# Patient Record
Sex: FEMALE | Race: WHITE | Employment: FULL TIME | URBAN - NONMETROPOLITAN AREA
[De-identification: names, ages, dates, MRNs, and addresses within clinical notes are randomized per-mention and may not be internally consistent; named-entity substitution may affect disease eponyms.]

---

## 2020-10-06 ENCOUNTER — OFFICE VISIT (OUTPATIENT)
Dept: PRIMARY CARE CLINIC | Age: 25
End: 2020-10-06
Payer: COMMERCIAL

## 2020-10-06 ENCOUNTER — HOSPITAL ENCOUNTER (OUTPATIENT)
Age: 25
Setting detail: SPECIMEN
Discharge: HOME | End: 2020-10-06
Payer: COMMERCIAL

## 2020-10-06 VITALS
HEIGHT: 66 IN | TEMPERATURE: 97.9 F | DIASTOLIC BLOOD PRESSURE: 68 MMHG | HEART RATE: 76 BPM | RESPIRATION RATE: 18 BRPM | SYSTOLIC BLOOD PRESSURE: 100 MMHG | BODY MASS INDEX: 25.88 KG/M2 | WEIGHT: 161 LBS | OXYGEN SATURATION: 97 %

## 2020-10-06 LAB
-: ABNORMAL
AMORPHOUS: ABNORMAL
BACTERIA: ABNORMAL
BILIRUBIN URINE: NEGATIVE
CASTS UA: ABNORMAL /LPF (ref 0–2)
COLOR: ABNORMAL
COMMENT UA: ABNORMAL
CRYSTALS, UA: ABNORMAL /HPF
EPITHELIAL CELLS UA: ABNORMAL /HPF (ref 0–5)
GLUCOSE URINE: NEGATIVE
KETONES, URINE: NEGATIVE
LEUKOCYTE ESTERASE, URINE: ABNORMAL
MUCUS: ABNORMAL
NITRITE, URINE: NEGATIVE
OTHER OBSERVATIONS UA: ABNORMAL
PH UA: 5.5 (ref 5–6)
PROTEIN UA: NEGATIVE
RBC UA: ABNORMAL /HPF (ref 0–4)
RENAL EPITHELIAL, UA: ABNORMAL /HPF
SPECIFIC GRAVITY UA: 1.02 (ref 1.01–1.02)
TRICHOMONAS: ABNORMAL
TURBIDITY: ABNORMAL
URINE HGB: ABNORMAL
UROBILINOGEN, URINE: NORMAL
WBC UA: ABNORMAL /HPF (ref 0–4)
YEAST: ABNORMAL

## 2020-10-06 PROCEDURE — 87077 CULTURE AEROBIC IDENTIFY: CPT

## 2020-10-06 PROCEDURE — 99203 OFFICE O/P NEW LOW 30 MIN: CPT | Performed by: FAMILY MEDICINE

## 2020-10-06 PROCEDURE — 87186 SC STD MICRODIL/AGAR DIL: CPT

## 2020-10-06 PROCEDURE — 87088 URINE BACTERIA CULTURE: CPT

## 2020-10-06 PROCEDURE — 81001 URINALYSIS AUTO W/SCOPE: CPT

## 2020-10-06 PROCEDURE — 87086 URINE CULTURE/COLONY COUNT: CPT

## 2020-10-06 RX ORDER — IBUPROFEN 200 MG
200 TABLET ORAL EVERY 6 HOURS PRN
COMMUNITY

## 2020-10-06 RX ORDER — NITROFURANTOIN 25; 75 MG/1; MG/1
100 CAPSULE ORAL 2 TIMES DAILY
Qty: 14 CAPSULE | Refills: 0 | Status: SHIPPED | OUTPATIENT
Start: 2020-10-06 | End: 2020-10-13

## 2020-10-06 SDOH — HEALTH STABILITY: MENTAL HEALTH: HOW OFTEN DO YOU HAVE A DRINK CONTAINING ALCOHOL?: NEVER

## 2020-10-06 ASSESSMENT — ENCOUNTER SYMPTOMS
COUGH: 0
SHORTNESS OF BREATH: 0
BACK PAIN: 0
NAUSEA: 0
VOMITING: 0

## 2020-10-06 NOTE — PROGRESS NOTES
Respiratory: Negative for cough and shortness of breath. Gastrointestinal: Negative for nausea and vomiting. Genitourinary: Positive for dysuria, frequency and urgency. Negative for difficulty urinating, flank pain, hematuria, hesitancy and vaginal discharge. Musculoskeletal: Negative for back pain. Objective:      Physical Exam  Vitals signs and nursing note reviewed. Constitutional:       General: She is not in acute distress. Appearance: She is well-developed. Eyes:      Conjunctiva/sclera: Conjunctivae normal.   Neck:      Musculoskeletal: Normal range of motion and neck supple. Thyroid: No thyromegaly. Cardiovascular:      Rate and Rhythm: Normal rate and regular rhythm. Heart sounds: Normal heart sounds. No murmur. Pulmonary:      Effort: Pulmonary effort is normal. No respiratory distress. Breath sounds: Normal breath sounds. No wheezing. Abdominal:      General: Abdomen is flat. Palpations: Abdomen is soft. Tenderness: There is abdominal tenderness in the suprapubic area. There is right CVA tenderness. There is no left CVA tenderness. Lymphadenopathy:      Cervical: No cervical adenopathy. Skin:     General: Skin is warm and dry. Findings: No erythema or rash. Neurological:      Mental Status: She is alert and oriented to person, place, and time. /68 (Site: Right Upper Arm, Position: Sitting, Cuff Size: Medium Adult)   Pulse 76   Temp 97.9 °F (36.6 °C)   Resp 18   Ht 5' 6\" (1.676 m)   Wt 161 lb (73 kg)   LMP 09/29/2020   SpO2 97%   BMI 25.99 kg/m²     Assessment:       Diagnosis Orders   1. Acute cystitis with hematuria     2.  Dysuria  Urinalysis Reflex to Culture      Hospital Outpatient Visit on 10/06/2020   Component Date Value Ref Range Status    Color, UA 10/06/2020 NOT REPORTED  YELLOW Final    Turbidity UA 10/06/2020 NOT REPORTED  CLEAR Final    Glucose, Ur 10/06/2020 NEGATIVE  NEGATIVE Final    Bilirubin Urine 10/06/2020 NEGATIVE  NEGATIVE Final    Ketones, Urine 10/06/2020 NEGATIVE  NEGATIVE Final    Specific [place], UA 10/06/2020 1.020  1.010 - 1.025 Final    Urine Hgb 10/06/2020 2+* NEGATIVE Final    pH, UA 10/06/2020 5.5  5.0 - 6.0 Final    Protein, UA 10/06/2020 NEGATIVE  NEGATIVE Final    Urobilinogen, Urine 10/06/2020 Normal  Normal Final    Nitrite, Urine 10/06/2020 NEGATIVE  NEGATIVE Final    Leukocyte Esterase, Urine 10/06/2020 3+* NEGATIVE Final    Urinalysis Comments 10/06/2020 NOT REPORTED   Final    - 10/06/2020        Final    WBC, UA 10/06/2020 10 TO 25  0 - 4 /HPF Final    RBC, UA 10/06/2020 10 TO 25  0 - 4 /HPF Final    Casts UA 10/06/2020 NOT REPORTED  0 - 2 /LPF Final    Crystals, UA 10/06/2020 NOT REPORTED  None /HPF Final    Epithelial Cells UA 10/06/2020 0 TO 4  0 - 5 /HPF Final    Renal Epithelial, UA 10/06/2020 NOT REPORTED  0 /HPF Final    Bacteria, UA 10/06/2020 TRACE* None Final    Mucus, UA 10/06/2020 NOT REPORTED  None Final    Trichomonas, UA 10/06/2020 NOT REPORTED  None Final    Amorphous, UA 10/06/2020 NOT REPORTED  None Final    Other Observations UA 10/06/2020 Specimen Cultured* NOT REQ. Final    Yeast, UA 10/06/2020 NOT REPORTED  None Final            Plan:        UTI: new; her UA shows she has a UTI. I will treat with macrobid for 7 days and follow up on the culture. If the culture shows resistance to macrobid then she will be notified and her antibiotic will be changed. I encouraged her to use tylenol or ibuprofen for pain. she was told to return to the clinic or ER if symptoms worsen, if she develops fevers or severe back pain. she understood and agreed with the plan. Return if symptoms worsen or fail to improve.     Orders Placed This Encounter   Procedures    Urinalysis Reflex to Culture     Standing Status:   Future     Number of Occurrences:   1     Standing Expiration Date:   10/6/2021     Order Specific Question: SPECIFY(EX-CATH,MIDSTREAM,CYSTO,ETC)? Answer:   clean catch     Orders Placed This Encounter   Medications    nitrofurantoin, macrocrystal-monohydrate, (MACROBID) 100 MG capsule     Sig: Take 1 capsule by mouth 2 times daily for 7 days     Dispense:  14 capsule     Refill:  0       Patientgiven educational materials - see patient instructions. Discussed use, benefit,and side effects of prescribed medications. All patient questions answered. Ptvoiced understanding. Reviewed health maintenance. Instructed to continue currentmedications, diet and exercise. Patient agreed with treatment plan. Follow up asdirected.      Electronically signed by Michelle Eldridge MD on 10/6/2020 at 10:38 AM

## 2020-10-07 LAB
CULTURE: ABNORMAL
CULTURE: ABNORMAL
Lab: ABNORMAL
SPECIMEN DESCRIPTION: ABNORMAL

## 2020-10-08 RX ORDER — CEPHALEXIN 500 MG/1
500 CAPSULE ORAL 3 TIMES DAILY
Qty: 21 CAPSULE | Refills: 0 | Status: SHIPPED | OUTPATIENT
Start: 2020-10-08 | End: 2020-10-15

## 2020-10-22 ENCOUNTER — OFFICE VISIT (OUTPATIENT)
Dept: FAMILY MEDICINE CLINIC | Age: 25
End: 2020-10-22
Payer: COMMERCIAL

## 2020-10-22 VITALS
HEIGHT: 67 IN | WEIGHT: 163.6 LBS | DIASTOLIC BLOOD PRESSURE: 70 MMHG | BODY MASS INDEX: 25.68 KG/M2 | SYSTOLIC BLOOD PRESSURE: 112 MMHG | HEART RATE: 60 BPM

## 2020-10-22 PROCEDURE — 99395 PREV VISIT EST AGE 18-39: CPT | Performed by: PHYSICIAN ASSISTANT

## 2020-10-22 ASSESSMENT — PATIENT HEALTH QUESTIONNAIRE - PHQ9
SUM OF ALL RESPONSES TO PHQ9 QUESTIONS 1 & 2: 0
2. FEELING DOWN, DEPRESSED OR HOPELESS: 0
SUM OF ALL RESPONSES TO PHQ QUESTIONS 1-9: 0
SUM OF ALL RESPONSES TO PHQ QUESTIONS 1-9: 0
1. LITTLE INTEREST OR PLEASURE IN DOING THINGS: 0
SUM OF ALL RESPONSES TO PHQ QUESTIONS 1-9: 0

## 2020-10-22 NOTE — PROGRESS NOTES
CHIEF COMPLAINT  Chief Complaint   Patient presents with   Alfie Khoury is a 23 y.o. female who presents to the office for annual wellness examination and to establish care. Patient says that she has been doing well. She maintains an active lifestyle and eats a healthy diet. She has never had cervical cancer screening and agrees to completing. Patient denies concerns or complaints today. ROS  All other review of systems negative, except for those noted. PAST MEDICAL HISTORY    Past Medical History:   Diagnosis Date    Concussion     h/o, x 7, athletics       SURGICAL HISTORY    History reviewed. No pertinent surgical history.     FAMILY HISTORY    Family History   Problem Relation Age of Onset    Thyroid Disease Mother     Prostate Cancer Father         prostate removed    No Known Problems Sister     No Known Problems Brother     Other Maternal Grandmother         brain aneurysm    No Known Problems Maternal Grandfather     Lung Cancer Paternal Grandmother     Cancer Paternal Grandfather         liver    No Known Problems Brother        SOCIAL HISTORY    Social History     Socioeconomic History    Marital status:      Spouse name: None    Number of children: None    Years of education: None    Highest education level: None   Occupational History    None   Social Needs    Financial resource strain: None    Food insecurity     Worry: None     Inability: None    Transportation needs     Medical: None     Non-medical: None   Tobacco Use    Smoking status: Never Smoker    Smokeless tobacco: Never Used   Substance and Sexual Activity    Alcohol use: Never     Frequency: Never    Drug use: Never    Sexual activity: Yes     Partners: Male     Birth control/protection: Condom   Lifestyle    Physical activity     Days per week: None     Minutes per session: None    Stress: None   Relationships    Social connections     Talks on phone: None     Gets encounter. FINAL DIAGNOSIS AND ORDERS   Diagnosis Orders   1. Well adult exam  Comprehensive Metabolic Panel   2. Screening for cervical cancer  [facility], CNP, Gynecology, [place]   3. Screening, lipid  Lipid Panel       ASSESSMENT & PLAN  1. History reviewed. Fasting laboratory studies ordered. Healthy diet and routine exercise. Referral for pap testing. Follow-up annually and PRN. DISCHARGE MEDS  Outpatient Encounter Medications as of 10/22/2020   Medication Sig Dispense Refill    ibuprofen (ADVIL;MOTRIN) 200 MG tablet Take 200 mg by mouth every 6 hours as needed for Pain       No facility-administered encounter medications on file as of 10/22/2020.

## 2020-10-23 ENCOUNTER — HOSPITAL ENCOUNTER (OUTPATIENT)
Dept: LAB | Age: 25
Discharge: HOME | End: 2020-10-23
Payer: COMMERCIAL

## 2020-10-23 LAB
ALBUMIN SERPL-MCNC: 4.5 G/DL (ref 3.5–5.2)
ALBUMIN/GLOBULIN RATIO: 1.8 (ref 1–2.5)
ALP BLD-CCNC: 69 U/L (ref 35–104)
ALT SERPL-CCNC: 7 U/L (ref 5–33)
ANION GAP SERPL CALCULATED.3IONS-SCNC: 9 MMOL/L (ref 9–17)
AST SERPL-CCNC: 13 U/L
BILIRUB SERPL-MCNC: 0.4 MG/DL (ref 0.3–1.2)
BUN BLDV-MCNC: 14 MG/DL (ref 6–20)
BUN/CREAT BLD: 23 (ref 9–20)
CALCIUM SERPL-MCNC: 9.4 MG/DL (ref 8.6–10.4)
CHLORIDE BLD-SCNC: 106 MMOL/L (ref 98–107)
CHOLESTEROL/HDL RATIO: 3.1
CHOLESTEROL: 152 MG/DL
CO2: 25 MMOL/L (ref 20–31)
CREAT SERPL-MCNC: 0.6 MG/DL (ref 0.5–0.9)
GFR AFRICAN AMERICAN: >60 ML/MIN
GFR NON-AFRICAN AMERICAN: >60 ML/MIN
GFR SERPL CREATININE-BSD FRML MDRD: ABNORMAL ML/MIN/{1.73_M2}
GFR SERPL CREATININE-BSD FRML MDRD: ABNORMAL ML/MIN/{1.73_M2}
GLUCOSE BLD-MCNC: 97 MG/DL (ref 70–99)
HDLC SERPL-MCNC: 49 MG/DL
LDL CHOLESTEROL: 87 MG/DL (ref 0–130)
POTASSIUM SERPL-SCNC: 4.4 MMOL/L (ref 3.7–5.3)
SODIUM BLD-SCNC: 140 MMOL/L (ref 135–144)
TOTAL PROTEIN: 7 G/DL (ref 6.4–8.3)
TRIGL SERPL-MCNC: 79 MG/DL
VLDLC SERPL CALC-MCNC: NORMAL MG/DL (ref 1–30)

## 2020-10-23 PROCEDURE — 36415 COLL VENOUS BLD VENIPUNCTURE: CPT

## 2020-10-23 PROCEDURE — 80061 LIPID PANEL: CPT

## 2020-10-23 PROCEDURE — 80053 COMPREHEN METABOLIC PANEL: CPT

## 2020-10-29 ENCOUNTER — HOSPITAL ENCOUNTER (OUTPATIENT)
Age: 25
Setting detail: SPECIMEN
Discharge: HOME | End: 2020-10-29
Payer: COMMERCIAL

## 2020-10-29 ENCOUNTER — OFFICE VISIT (OUTPATIENT)
Dept: OBGYN | Age: 25
End: 2020-10-29
Payer: COMMERCIAL

## 2020-10-29 VITALS
HEIGHT: 67 IN | HEART RATE: 78 BPM | BODY MASS INDEX: 25.58 KG/M2 | DIASTOLIC BLOOD PRESSURE: 70 MMHG | WEIGHT: 163 LBS | SYSTOLIC BLOOD PRESSURE: 102 MMHG | TEMPERATURE: 96.9 F

## 2020-10-29 PROCEDURE — 99385 PREV VISIT NEW AGE 18-39: CPT | Performed by: NURSE PRACTITIONER

## 2020-10-29 PROCEDURE — G0145 SCR C/V CYTO,THINLAYER,RESCR: HCPCS

## 2020-10-29 NOTE — PROGRESS NOTES
Carina Serena  10/29/2020              23 y.o. Chief Complaint   Patient presents with    Gynecologic Exam         Patient's last menstrual period was 09/29/2020. FAY Wray  [facility], [address] [place]    HPI :Annual Exam  Patient presents for annual exam.  Counseling on healthy lifestyle reviewed, as well as the need for self breast exam. We discussed and reviewed the need for routine screenings and immunization updates when appropriate. Menarche age 8. Menses regular every month lasting 7 days with no excessive bleeding or pain. Using condoms and is happy with that method. Not planning pregnancy in th foreseeable future. Recently . Has had no other sexual partners, and declines STI screening. Patient does not perform self breast exam.  Is aware of the need to do so on a monthly basis. Proper technique was reviewed and shower card was provided. The patient is sexually active. no prior history of gyn screening tests  The patient has regular exercise: not regularly . We did review the need for and frequency of both weight bearing and strengthening as tolerated by the patient. ________________________________________________________________________  OB History   No obstetric history on file. Past Medical History:   Diagnosis Date    Concussion     h/o, x 7, athletics                                                                   History reviewed. No pertinent surgical history.   Family History   Problem Relation Age of Onset    Thyroid Disease Mother     Prostate Cancer Father         prostate removed    No Known Problems Sister     No Known Problems Brother     Other Maternal Grandmother         brain aneurysm    No Known Problems Maternal Grandfather     Lung Cancer Paternal Grandmother     Cancer Paternal Grandfather         liver    No Known Problems Brother      Social History     Socioeconomic History    Marital status:  Spouse name: Not on file    Number of children: Not on file    Years of education: Not on file    Highest education level: Not on file   Occupational History    Not on file   Social Needs    Financial resource strain: Not on file    Food insecurity     Worry: Not on file     Inability: Not on file    Transportation needs     Medical: Not on file     Non-medical: Not on file   Tobacco Use    Smoking status: Never Smoker    Smokeless tobacco: Never Used   Substance and Sexual Activity    Alcohol use: Never     Frequency: Never    Drug use: Never    Sexual activity: Yes     Partners: Male     Birth control/protection: Condom   Lifestyle    Physical activity     Days per week: Not on file     Minutes per session: Not on file    Stress: Not on file   Relationships    Social connections     Talks on phone: Not on file     Gets together: Not on file     Attends Mu-ism service: Not on file     Active member of club or organization: Not on file     Attends meetings of clubs or organizations: Not on file     Relationship status: Not on file    Intimate partner violence     Fear of current or ex partner: Not on file     Emotionally abused: Not on file     Physically abused: Not on file     Forced sexual activity: Not on file   Other Topics Concern    Not on file   Social History Narrative    Not on file       MEDICATIONS:  Current Outpatient Medications   Medication Sig Dispense Refill    ibuprofen (ADVIL;MOTRIN) 200 MG tablet Take 200 mg by mouth every 6 hours as needed for Pain       No current facility-administered medications for this visit. ALLERGIES:  Allergies as of 10/29/2020 - Review Complete 10/29/2020   Allergen Reaction Noted    Pcn [penicillins] Hives 10/06/2020       Gynecologic History:  Menarche: 13   Menopause at n/a      Patient's last menstrual period was 09/29/2020.   Hormone Exposure: No    Family History of Breast, Ovarian , Colon or Uterine Cancer: No ________________________________________________________________________      Review Of Systems:  General ROS:  negative  Hematological and Lymphatic ROS:negative   Breast ROS: negative  Cardiovascular ROS: negative  Respiratory ROS: negative   Gastrointestinal ROS: negative  Genito-Urinary ROS: negative  Psychological ROS: negative  Neurological ROS: negative  Musculoskeletal ROS: negative  Dermatological ROS: negative                                                                                                                                                                                   PHYSICAL Exam:     Constitutional:  Blood pressure 102/70, pulse 78, temperature 96.9 °F (36.1 °C), temperature source Temporal, height 5' 7\" (1.702 m), weight 163 lb (73.9 kg), last menstrual period 09/29/2020. General Appearance: This  is a well Developed, well Nourished, well groomed female. Her BMI was reviewed. Skin:  There was a Normal Inspection of the skin without rashes or lesions. Lymphatic:  No Lymph Nodes were Palpable in the neck , axilla or groin. Neck and EENT:  The neck was supple. There were no masses   The thyroid was not enlarged and had no masses. PERRLA, Nares Patent No Masses    Respiratory: The lungs were auscultated and found to be clear. There were no rales, rhonchi or wheezes. There was a good respiratory effort. Cardiovascular: The heart was in a regular rate and rhythm. There was no murmur appreciated. Extremities: The patients extremities were without calf tenderness, edema, or varicosities. There was full range of motion in all four extremities. Pulses in all four extremities    Abdomen: The abdomen was soft and non-tender. There was no guarding, rebound or rigidity. On evaluation there was no evidence of hepatosplenomegaly and there was no costal vertebral kelley tenderness bilaterally. No hernias were appreciated. Psych:   The patient had a normal 2/28/2021    PAP SMEAR     Standing Status:   Future     Number of Occurrences:   1     Standing Expiration Date:   12/29/2020     Order Specific Question:   Collection Type     Answer: Thin Prep     Order Specific Question:   Prior Abnormal Pap Test     Answer:   No     Order Specific Question:   Screening or Diagnostic     Answer:   Screening     Order Specific Question:   HPV Requested?      Answer:   Yes - If Abnormal Reflex HPV       Funmi Turpin  10/29/2020

## 2020-10-29 NOTE — PATIENT INSTRUCTIONS
Vitamin D3 1000 IU (25 mcg) daily with largest meal      Patient Education        Breast Self-Exam: Care Instructions  Your Care Instructions     A breast self-exam is when you check your breasts for lumps or changes. This regular exam helps you learn how your breasts normally look and feel. Most breast problems or changes are not because of cancer. Breast self-exam is not a substitute for a mammogram. Having regular breast exams by your doctor and regular mammograms improve your chances of finding any problems with your breasts. Some women set a time each month to do a step-by-step breast self-exam. Other women like a less formal system. They might look at their breasts as they brush their teeth, or feel their breasts once in a while in the shower. If you notice a change in your breast, tell your doctor. Follow-up care is a key part of your treatment and safety. Be sure to make and go to all appointments, and call your doctor if you are having problems. It's also a good idea to know your test results and keep a list of the medicines you take. How do you do a breast self-exam?  · The best time to examine your breasts is usually one week after your menstrual period begins. Your breasts should not be tender then. If you do not have periods, you might do your exam on a day of the month that is easy to remember. · To examine your breasts:  ? Remove all your clothes above the waist and lie down. When you are lying down, your breast tissue spreads evenly over your chest wall, which makes it easier to feel all your breast tissue. ? Use the pads--not the fingertips--of the 3 middle fingers of your left hand to check your right breast. Move your fingers slowly in small coin-sized circles that overlap. ? Use three levels of pressure to feel of all your breast tissue. Use light pressure to feel the tissue close to the skin surface. Use medium pressure to feel a little deeper.  Use firm pressure to feel your tissue close to your breastbone and ribs. Use each pressure level to feel your breast tissue before moving on to the next spot. ? Check your entire breast, moving up and down as if following a strip from the collarbone to the bra line, and from the armpit to the ribs. Repeat until you have covered the entire breast.  ? Repeat this procedure for your left breast, using the pads of the 3 middle fingers of your right hand. · To examine your breasts while in the shower:  ? Place one arm over your head and lightly soap your breast on that side. ? Using the pads of your fingers, gently move your hand over your breast (in the strip pattern described above), feeling carefully for any lumps or changes. ? Repeat for the other breast.  · Have your doctor inspect anything you notice to see if you need further testing. Where can you learn more? Go to [email or web address] and sign in to your [employer] account. Enter P148 in the [employer] box to learn more about \"Breast Self-Exam: Care Instructions. \"     If you do not have an account, please click on the \"Sign Up Now\" link. Current as of: April 29, 2020               Content Version: 12.6  © 6001-7484 [employer]. Care instructions adapted under license by [facility] [employer] [facility] ([facility]). If you have questions about a medical condition or this instruction, always ask your healthcare professional. [place] [address] any warranty or liability for your use of this information.

## 2020-11-06 ENCOUNTER — TELEPHONE (OUTPATIENT)
Dept: OBGYN | Age: 25
End: 2020-11-06

## 2020-11-06 LAB — CYTOLOGY REPORT: NORMAL

## 2020-11-06 NOTE — TELEPHONE ENCOUNTER
Patient contacted and advised of over due labs TSH and T4 . Patient would still lina to have drawn.  Orders extended for patient

## 2020-11-09 ENCOUNTER — HOSPITAL ENCOUNTER (OUTPATIENT)
Dept: LAB | Age: 25
Discharge: HOME | End: 2020-11-09
Payer: COMMERCIAL

## 2020-11-09 LAB
THYROXINE, FREE: 1.11 NG/DL (ref 0.93–1.7)
TSH SERPL DL<=0.05 MIU/L-ACNC: 1.44 MIU/L (ref 0.3–5)

## 2020-11-09 PROCEDURE — 36415 COLL VENOUS BLD VENIPUNCTURE: CPT

## 2020-11-09 PROCEDURE — 84443 ASSAY THYROID STIM HORMONE: CPT

## 2020-11-09 PROCEDURE — 84439 ASSAY OF FREE THYROXINE: CPT

## 2020-12-08 ENCOUNTER — OFFICE VISIT (OUTPATIENT)
Dept: PRIMARY CARE CLINIC | Age: 25
End: 2020-12-08
Payer: COMMERCIAL

## 2020-12-08 ENCOUNTER — HOSPITAL ENCOUNTER (OUTPATIENT)
Age: 25
Setting detail: SPECIMEN
Discharge: HOME | End: 2020-12-08
Payer: COMMERCIAL

## 2020-12-08 VITALS
WEIGHT: 168 LBS | OXYGEN SATURATION: 99 % | TEMPERATURE: 98.9 F | DIASTOLIC BLOOD PRESSURE: 80 MMHG | BODY MASS INDEX: 26.31 KG/M2 | SYSTOLIC BLOOD PRESSURE: 128 MMHG | HEART RATE: 88 BPM

## 2020-12-08 PROCEDURE — U0003 INFECTIOUS AGENT DETECTION BY NUCLEIC ACID (DNA OR RNA); SEVERE ACUTE RESPIRATORY SYNDROME CORONAVIRUS 2 (SARS-COV-2) (CORONAVIRUS DISEASE [COVID-19]), AMPLIFIED PROBE TECHNIQUE, MAKING USE OF HIGH THROUGHPUT TECHNOLOGIES AS DESCRIBED BY CMS-2020-01-R: HCPCS

## 2020-12-08 PROCEDURE — 99213 OFFICE O/P EST LOW 20 MIN: CPT | Performed by: NURSE PRACTITIONER

## 2020-12-08 ASSESSMENT — ENCOUNTER SYMPTOMS
WHEEZING: 0
NAUSEA: 1
SHORTNESS OF BREATH: 0
COUGH: 1
VOMITING: 0

## 2020-12-09 NOTE — PATIENT INSTRUCTIONS
Will notify you of covid test result as soon as available. You should isolate at home in an area away from family. If you must be around family members, please wear a mask. Quarantine at home until result is available. This means do not go to work/school, attend family gatherings, or invite others to your home until you know your test results. A work/school note will be provided for you. Symptoms appear viral; no antibiotic warranted at this time. May try mucinex (guaifenesin) to help with chest congestion and robitussin for persistent cough if needed. May use tylenol or ibuprofen for fever/body aches/headache. Increase water intake to help thin secretions. Use cool mist humidifier at bedtime. Use nasal saline flush as needed. Practice good hand hygiene and cover your cough and sneeze to prevent passing virus on. If symptoms worsen or fail to improve, please return to clinic. If you develop severe worsening of symptoms such as chest pain or difficulty breathing, please go to ER. Patient Education        Coronavirus ([id number]): Care Instructions  Overview  The coronavirus disease (COVID-19) is caused by a virus. Symptoms may include a fever, a cough, and shortness of breath. It mainly spreads person-to-person through droplets from coughing and sneezing. The virus also can spread when people are in close contact with someone who is infected. Most people have mild symptoms and can take care of themselves at home. If their symptoms get worse, they may need care in a hospital. Treatment may include medicines to reduce symptoms, plus breathing support such as oxygen therapy or a ventilator. It's important to not spread the virus to others. If you have COVID-19, wear a face cover anytime you are around other people. You need to isolate yourself while you are sick. Leave your home only if you need to get medical care or testing. Follow-up care is a key part of your treatment and safety.  Be sure to make and go to all appointments, and call your doctor if you are having problems. It's also a good idea to know your test results and keep a list of the medicines you take. How can you care for yourself at home? · Get extra rest. It can help you feel better. · Drink plenty of fluids. This helps replace fluids lost from fever. Fluids also help ease a scratchy throat. Water, soup, fruit juice, and hot tea with lemon are good choices. · Take acetaminophen (such as Tylenol) to reduce a fever. It may also help with muscle aches. Read and follow all instructions on the label. · Use petroleum jelly on sore skin. This can help if the skin around your nose and lips becomes sore from rubbing a lot with tissues. Tips for self-isolation  · Limit contact with people in your home. If possible, stay in a separate bedroom and use a separate bathroom. · Wear a cloth face cover when you are around other people. It can help stop the spread of the virus when you cough or sneeze. · If you have to leave home, avoid crowds and try to stay at least 6 feet away from other people. · Avoid contact with pets and other animals. · Cover your mouth and nose with a tissue when you cough or sneeze. Then throw it in the trash right away. · Wash your hands often, especially after you cough or sneeze. Use soap and water, and scrub for at least 20 seconds. If soap and water aren't available, use an alcohol-based hand . · Don't share personal household items. These include bedding, towels, cups and glasses, and eating utensils. · [address] in the warmest water allowed for the fabric type, and dry it completely. It's okay to wash other people's laundry with yours. · Clean and disinfect your home every day. Use household  and disinfectant wipes or sprays. Take special care to clean things that you grab with your hands. These include doorknobs, remote controls, phones, and handles on your refrigerator and microwave.  And don't forget countertops, tabletops, bathrooms, and computer keyboards. When you can end self-isolation  · If you know or suspect that you have COVID-19, stay in self-isolation until:  ? You haven't had a fever for 3 days, and  ? Your symptoms have improved, and  ? It's been at least 10 days since your symptoms started. · Talk to your doctor about whether you also need testing, especially if you have a weakened immune system. When should you call for help? Call 911 anytime you think you may need emergency care. For example, call if you have life-threatening symptoms, such as:    · You have severe trouble breathing. (You can't talk at all.)     · You have constant chest pain or pressure.     · You are severely dizzy or lightheaded.     · You are confused or can't think clearly.     · Your face and lips have a blue color.     · You pass out (lose consciousness) or are very hard to wake up. Call your doctor now or seek immediate medical care if:    · You have moderate trouble breathing. (You can't speak a full sentence.)     · You are coughing up blood (more than about 1 teaspoon).     · You have signs of low blood pressure. These include feeling lightheaded; being too weak to stand; and having cold, pale, clammy skin. Watch closely for changes in your health, and be sure to contact your doctor if:    · Your symptoms get worse.     · You are not getting better as expected. Call before you go to the doctor's office. Follow their instructions. And wear a cloth face cover. Current as of: July 10, 2020               Content Version: 12.6  © 2006-2020 [employer]. Care instructions adapted under license by [facility] ([facility]). If you have questions about a medical condition or this instruction, always ask your healthcare professional. [address] any warranty or liability for your use of this information.

## 2020-12-09 NOTE — PROGRESS NOTES
[facility]  [facility] [place]  [place] [address] [place]  Dept: [phone]  Dept Fax: [phone]  Loc: [phone]        CHIEF COMPLAINT       Chief Complaint   Patient presents with    Headache     x2 days. body aches. nausea. fatigue. Nurses Notes reviewed and I agree except as noted in the HPI. HISTORY OF PRESENT ILLNESS   Carina Lyons is a 23 y.o. female who presents to [facility] today (12/8/2020) for evaluation of:   Headache    This is a new problem. Episode onset: x 2 days. The pain is located in the frontal region. Associated symptoms include coughing (slight), muscle aches and nausea. Pertinent negatives include no fever or vomiting. Associated symptoms comments: Fatigue, slight sinus congestion. The symptoms are aggravated by bright light (computer/phone screens). She has tried NSAIDs for the symptoms. The treatment provided mild relief. There is no history of hypertension, migraine headaches or recent head traumas. REVIEW OF SYSTEMS     Review of Systems   Constitutional: Negative for fever. HENT: Positive for congestion (slight). Respiratory: Positive for cough (slight). Negative for shortness of breath and wheezing. Cardiovascular: Negative for chest pain. Gastrointestinal: Positive for nausea. Negative for vomiting. Musculoskeletal: Negative for myalgias. Neurological: Positive for headaches. PAST MEDICAL HISTORY         Diagnosis Date    Concussion     h/o, x 7, athletics       SURGICAL HISTORY     Patient  has no past surgical history on file. CURRENT MEDICATIONS       Outpatient Medications Prior to Visit   Medication Sig Dispense Refill    ibuprofen (ADVIL;MOTRIN) 200 MG tablet Take 200 mg by mouth every 6 hours as needed for Pain       No facility-administered medications prior to visit. ALLERGIES     Patient is is allergic to pcn [penicillins].     FAMILY HISTORY     Patient's family history includes Cancer in her paternal grandfather; Roxy Carlton in her paternal grandmother; No Known Problems in her brother, brother, maternal grandfather, and sister; Other in her maternal grandmother; Prostate Cancer in her father; Thyroid Disease in her mother. SOCIAL HISTORY     Patient  reports that she has never smoked. She has never used smokeless tobacco. She reports that she does not drink alcohol or use drugs. PHYSICAL EXAM     VITALS  BP: 128/80, Temp: 98.9 °F (37.2 °C), Pulse: 88,  , SpO2: 99 %  Physical Exam  Vitals signs reviewed. Constitutional:       General: She is not in acute distress. Appearance: She is not ill-appearing. HENT:      Right Ear: Tympanic membrane and ear canal normal.      Left Ear: Tympanic membrane and ear canal normal.      Nose: Rhinorrhea present. No congestion. Right Turbinates: Not enlarged or swollen. Left Turbinates: Not enlarged or swollen. Comments: Bilateral turbinates inflamed     Mouth/Throat:      Lips: Pink. Mouth: Mucous membranes are moist.      Pharynx: Oropharynx is clear. No oropharyngeal exudate or posterior oropharyngeal erythema. Comments: Clear drainage noted in posterior pharynx  Eyes:      Pupils: Pupils are equal, round, and reactive to light. Neck:      Musculoskeletal: Normal range of motion and neck supple. No neck rigidity. Cardiovascular:      Rate and Rhythm: Normal rate and regular rhythm. Heart sounds: Normal heart sounds, S1 normal and S2 normal. No murmur. Pulmonary:      Effort: Pulmonary effort is normal. No accessory muscle usage or respiratory distress. Breath sounds: Normal breath sounds. No wheezing or rhonchi. Musculoskeletal: Normal range of motion. Lymphadenopathy:      Cervical: No cervical adenopathy. Skin:     General: Skin is warm and dry. Capillary Refill: Capillary refill takes less than 2 seconds.    Neurological:      General: No people are in close contact with someone who is infected. Most people have mild symptoms and can take care of themselves at home. If their symptoms get worse, they may need care in a hospital. Treatment may include medicines to reduce symptoms, plus breathing support such as oxygen therapy or a ventilator. It's important to not spread the virus to others. If you have COVID-19, wear a face cover anytime you are around other people. You need to isolate yourself while you are sick. Leave your home only if you need to get medical care or testing. Follow-up care is a key part of your treatment and safety. Be sure to make and go to all appointments, and call your doctor if you are having problems. It's also a good idea to know your test results and keep a list of the medicines you take. How can you care for yourself at home? · Get extra rest. It can help you feel better. · Drink plenty of fluids. This helps replace fluids lost from fever. Fluids also help ease a scratchy throat. Water, soup, fruit juice, and hot tea with lemon are good choices. · Take acetaminophen (such as Tylenol) to reduce a fever. It may also help with muscle aches. Read and follow all instructions on the label. · Use petroleum jelly on sore skin. This can help if the skin around your nose and lips becomes sore from rubbing a lot with tissues. Tips for self-isolation  · Limit contact with people in your home. If possible, stay in a separate bedroom and use a separate bathroom. · Wear a cloth face cover when you are around other people. It can help stop the spread of the virus when you cough or sneeze. · If you have to leave home, avoid crowds and try to stay at least 6 feet away from other people. · Avoid contact with pets and other animals. · Cover your mouth and nose with a tissue when you cough or sneeze. Then throw it in the trash right away. · Wash your hands often, especially after you cough or sneeze.  Use soap and water, and scrub for at least 20 seconds. If soap and water aren't available, use an alcohol-based hand . · Don't share personal household items. These include bedding, towels, cups and glasses, and eating utensils. · [address] in the warmest water allowed for the fabric type, and dry it completely. It's okay to wash other people's laundry with yours. · Clean and disinfect your home every day. Use household  and disinfectant wipes or sprays. Take special care to clean things that you grab with your hands. These include doorknobs, remote controls, phones, and handles on your refrigerator and microwave. And don't forget countertops, tabletops, bathrooms, and computer keyboards. When you can end self-isolation  · If you know or suspect that you have COVID-19, stay in self-isolation until:  ? You haven't had a fever for 3 days, and  ? Your symptoms have improved, and  ? It's been at least 10 days since your symptoms started. · Talk to your doctor about whether you also need testing, especially if you have a weakened immune system. When should you call for help? Call 911 anytime you think you may need emergency care. For example, call if you have life-threatening symptoms, such as:    · You have severe trouble breathing. (You can't talk at all.)     · You have constant chest pain or pressure.     · You are severely dizzy or lightheaded.     · You are confused or can't think clearly.     · Your face and lips have a blue color.     · You pass out (lose consciousness) or are very hard to wake up. Call your doctor now or seek immediate medical care if:    · You have moderate trouble breathing. (You can't speak a full sentence.)     · You are coughing up blood (more than about 1 teaspoon).     · You have signs of low blood pressure. These include feeling lightheaded; being too weak to stand; and having cold, pale, clammy skin.    Watch closely for changes in your health, and be sure to contact your doctor if:    · Your symptoms get worse.     · You are not getting better as expected. Call before you go to the doctor's office. Follow their instructions. And wear a cloth face cover. Current as of: July 10, 2020               Content Version: 12.6  © 7613-1182 [employer]. Care instructions adapted under license by [facility] ([facility]). If you have questions about a medical condition or this instruction, always ask your healthcare professional. [place] [address] any warranty or liability for your use of this information. Orders Placed This Encounter   Procedures    COVID-19 Ambulatory     Standing Status:   Future     Standing Expiration Date:   12/8/2021     Scheduling Instructions:      Saline media preferred given current shortage of viral transport media but both acceptable     Order Specific Question:   Is this test for diagnosis or screening? Answer:   Diagnosis of ill patient     Order Specific Question:   Symptomatic for COVID-19 as defined by CDC? Answer:   Yes     Order Specific Question:   Date of Symptom Onset     Answer:   12/6/2020     Order Specific Question:   Hospitalized for COVID-19? Answer:   No     Order Specific Question:   Admitted to ICU for COVID-19? Answer:   No     Order Specific Question:   Employed in healthcare setting? Answer:   No     Order Specific Question:   Resident in a congregate (group) care setting? Answer:   No     Order Specific Question:   Pregnant? Answer:   No     Order Specific Question:   Previously tested for COVID-19? Answer:   No     Outpatient Encounter Medications as of 12/8/2020   Medication Sig Dispense Refill    ibuprofen (ADVIL;MOTRIN) 200 MG tablet Take 200 mg by mouth every 6 hours as needed for Pain       No facility-administered encounter medications on file as of 12/8/2020. No follow-ups on file.                 Electronically signed by DAMARIS Irby NP on 12/8/2020 at 8:39 PM

## 2020-12-12 LAB — SARS-COV-2, NAA: NOT DETECTED

## 2021-05-12 ENCOUNTER — OFFICE VISIT (OUTPATIENT)
Dept: PRIMARY CARE CLINIC | Age: 26
End: 2021-05-12

## 2021-05-12 VITALS
HEIGHT: 65 IN | DIASTOLIC BLOOD PRESSURE: 68 MMHG | OXYGEN SATURATION: 94 % | WEIGHT: 175.8 LBS | BODY MASS INDEX: 29.29 KG/M2 | SYSTOLIC BLOOD PRESSURE: 124 MMHG | RESPIRATION RATE: 18 BRPM | TEMPERATURE: 97.3 F | HEART RATE: 120 BPM

## 2021-05-12 DIAGNOSIS — H69.83 DYSFUNCTION OF BOTH EUSTACHIAN TUBES: ICD-10-CM

## 2021-05-12 DIAGNOSIS — J01.00 ACUTE NON-RECURRENT MAXILLARY SINUSITIS: Primary | ICD-10-CM

## 2021-05-12 PROCEDURE — 99212 OFFICE O/P EST SF 10 MIN: CPT | Performed by: PHYSICIAN ASSISTANT

## 2021-05-12 PROCEDURE — 99213 OFFICE O/P EST LOW 20 MIN: CPT | Performed by: PHYSICIAN ASSISTANT

## 2021-05-12 RX ORDER — FLUCONAZOLE 150 MG/1
TABLET ORAL
Qty: 2 TABLET | Refills: 0 | Status: SHIPPED | OUTPATIENT
Start: 2021-05-12 | End: 2021-08-31 | Stop reason: SDUPTHER

## 2021-05-12 RX ORDER — CEFDINIR 300 MG/1
300 CAPSULE ORAL 2 TIMES DAILY
Qty: 10 CAPSULE | Refills: 0 | Status: SHIPPED | OUTPATIENT
Start: 2021-05-12 | End: 2021-05-17

## 2021-05-12 RX ORDER — PREDNISONE 20 MG/1
20 TABLET ORAL 2 TIMES DAILY
Qty: 10 TABLET | Refills: 0 | Status: SHIPPED | OUTPATIENT
Start: 2021-05-12 | End: 2021-05-17

## 2021-05-12 ASSESSMENT — ENCOUNTER SYMPTOMS
SHORTNESS OF BREATH: 0
SORE THROAT: 1
RHINORRHEA: 1
COUGH: 1

## 2021-05-12 ASSESSMENT — PATIENT HEALTH QUESTIONNAIRE - PHQ9
2. FEELING DOWN, DEPRESSED OR HOPELESS: 0
SUM OF ALL RESPONSES TO PHQ QUESTIONS 1-9: 0
SUM OF ALL RESPONSES TO PHQ QUESTIONS 1-9: 0

## 2021-05-12 NOTE — PROGRESS NOTES
Subjective:      Patient ID: Vini Villalpando is a 23 y.o. female. Cough  This is a new problem. The current episode started in the past 7 days. Associated symptoms include ear congestion, headaches, nasal congestion, rhinorrhea and a sore throat (initially). Pertinent negatives include no fever or shortness of breath. Associated symptoms comments: No loss of taste/smell. Treatments tried: Mucinex, Delsym. The treatment provided mild relief. There is no history of asthma or environmental allergies. Review of Systems   Constitutional: Negative for fever. HENT: Positive for rhinorrhea and sore throat (initially). Respiratory: Positive for cough. Negative for shortness of breath. Allergic/Immunologic: Negative for environmental allergies. Neurological: Positive for headaches. Recent Travel Screening and Travel History documentation:     Travel Screening     Question   Response    In the last month, have you been in contact with someone who was confirmed or suspected to have Coronavirus / COVID-19? No / Unsure    Have you had a COVID-19 viral test in the last 14 days? No    Do you have any of the following new or worsening symptoms? Fatigue;Cough; Shortness of breath;Runny nose    Have you traveled internationally or domestically in the last month? No      Travel History   Travel since 04/12/21     No documented travel since 04/12/21          tested negative for COVID last week. Objective:   Physical Exam  HENT:      Head: Normocephalic. Ears:      Comments: TMs dull bilaterally     Nose: Congestion present. Mouth/Throat:      Mouth: Mucous membranes are moist.   Eyes:      Pupils: Pupils are equal, round, and reactive to light. Neck:      Musculoskeletal: Neck supple. Cardiovascular:      Rate and Rhythm: Normal rate. Pulmonary:      Effort: Pulmonary effort is normal.      Breath sounds: Normal breath sounds. Neurological:      General: No focal deficit present. Mental Status: She is alert and oriented to person, place, and time. Psychiatric:         Mood and Affect: Mood normal.         Assessment:      1. Acute non-recurrent maxillary sinusitis    2. Dysfunction of both eustachian tubes          Plan:      Patient declined COVID testing. Omnicef 300 mg tid x 10 days. Prednisone 20 mg bid x 5 days. Push fluids. Supportive care advised. Nasal saline rinses. Follow-up PRN/as planned with PCP.         FAY Dowd

## 2021-08-31 ENCOUNTER — NURSE TRIAGE (OUTPATIENT)
Dept: OTHER | Facility: CLINIC | Age: 26
End: 2021-08-31

## 2021-08-31 ENCOUNTER — HOSPITAL ENCOUNTER (OUTPATIENT)
Dept: LAB | Age: 26
Discharge: HOME | End: 2021-08-31
Payer: COMMERCIAL

## 2021-08-31 ENCOUNTER — OFFICE VISIT (OUTPATIENT)
Dept: FAMILY MEDICINE CLINIC | Age: 26
End: 2021-08-31
Payer: COMMERCIAL

## 2021-08-31 ENCOUNTER — PATIENT MESSAGE (OUTPATIENT)
Dept: FAMILY MEDICINE CLINIC | Age: 26
End: 2021-08-31

## 2021-08-31 VITALS
OXYGEN SATURATION: 97 % | TEMPERATURE: 98.5 F | WEIGHT: 181 LBS | DIASTOLIC BLOOD PRESSURE: 64 MMHG | HEART RATE: 74 BPM | SYSTOLIC BLOOD PRESSURE: 122 MMHG | BODY MASS INDEX: 30.16 KG/M2 | HEIGHT: 65 IN

## 2021-08-31 DIAGNOSIS — R30.0 DYSURIA: ICD-10-CM

## 2021-08-31 DIAGNOSIS — N39.0 URINARY TRACT INFECTION WITHOUT HEMATURIA, SITE UNSPECIFIED: Primary | ICD-10-CM

## 2021-08-31 DIAGNOSIS — R10.9 FLANK PAIN: ICD-10-CM

## 2021-08-31 DIAGNOSIS — R30.0 DYSURIA: Primary | ICD-10-CM

## 2021-08-31 DIAGNOSIS — M54.50 ACUTE MIDLINE LOW BACK PAIN WITHOUT SCIATICA: ICD-10-CM

## 2021-08-31 LAB
-: ABNORMAL
AMORPHOUS: ABNORMAL
BACTERIA: ABNORMAL
BILIRUBIN URINE: ABNORMAL
CASTS UA: ABNORMAL /LPF (ref 0–2)
COLOR: ABNORMAL
COMMENT UA: ABNORMAL
CRYSTALS, UA: ABNORMAL /HPF
EPITHELIAL CELLS UA: ABNORMAL /HPF (ref 0–5)
GLUCOSE URINE: ABNORMAL
KETONES, URINE: ABNORMAL
LEUKOCYTE ESTERASE, URINE: ABNORMAL
MUCUS: ABNORMAL
NITRITE, URINE: POSITIVE
OTHER OBSERVATIONS UA: ABNORMAL
PH UA: 5 (ref 5–6)
PROTEIN UA: ABNORMAL
RBC UA: ABNORMAL /HPF (ref 0–4)
RENAL EPITHELIAL, UA: ABNORMAL /HPF
SPECIFIC GRAVITY UA: 1.02 (ref 1.01–1.02)
TRICHOMONAS: ABNORMAL
TURBIDITY: ABNORMAL
URINE HGB: ABNORMAL
UROBILINOGEN, URINE: ABNORMAL
WBC UA: >50 /HPF (ref 0–4)
YEAST: ABNORMAL

## 2021-08-31 PROCEDURE — 81001 URINALYSIS AUTO W/SCOPE: CPT

## 2021-08-31 PROCEDURE — 87088 URINE BACTERIA CULTURE: CPT

## 2021-08-31 PROCEDURE — 87186 SC STD MICRODIL/AGAR DIL: CPT

## 2021-08-31 PROCEDURE — 99214 OFFICE O/P EST MOD 30 MIN: CPT | Performed by: FAMILY MEDICINE

## 2021-08-31 PROCEDURE — 87086 URINE CULTURE/COLONY COUNT: CPT

## 2021-08-31 RX ORDER — FLUCONAZOLE 150 MG/1
TABLET ORAL
Qty: 2 TABLET | Refills: 0 | Status: SHIPPED | OUTPATIENT
Start: 2021-08-31 | End: 2022-01-07

## 2021-08-31 RX ORDER — FLUCONAZOLE 150 MG/1
TABLET ORAL
Qty: 2 TABLET | Refills: 0 | Status: CANCELLED | OUTPATIENT
Start: 2021-08-31

## 2021-08-31 RX ORDER — CIPROFLOXACIN 500 MG/1
500 TABLET, FILM COATED ORAL 2 TIMES DAILY
Qty: 14 TABLET | Refills: 0 | Status: SHIPPED | OUTPATIENT
Start: 2021-08-31 | End: 2021-09-07

## 2021-08-31 NOTE — PROGRESS NOTES
HPI:  Patient comes in today for   Chief Complaint   Patient presents with    Dysuria     started last night    Flank Pain     started last night   c/o Dysuria,frequency and urgency the past week ,no nausea or emesis,no fever or chills. Has pain in the low back    area,No heamturia. Has had UTI year ago culture grew proteus was sensitive to Cipro. HISTORY:  Past Medical History:   Diagnosis Date    Concussion     h/o, x 7, athletics       No past surgical history on file. Family History   Problem Relation Age of Onset    Thyroid Disease Mother     Prostate Cancer Father         prostate removed    No Known Problems Sister     No Known Problems Brother     Other Maternal Grandmother         brain aneurysm    No Known Problems Maternal Grandfather     Lung Cancer Paternal Grandmother     Cancer Paternal Grandfather         liver    No Known Problems Brother        Social History     Socioeconomic History    Marital status:      Spouse name: Not on file    Number of children: Not on file    Years of education: Not on file    Highest education level: Not on file   Occupational History    Not on file   Tobacco Use    Smoking status: Never Smoker    Smokeless tobacco: Never Used   Vaping Use    Vaping Use: Never used   Substance and Sexual Activity    Alcohol use: Never    Drug use: Never    Sexual activity: Yes     Partners: Male     Birth control/protection: Condom   Other Topics Concern    Not on file   Social History Narrative    Not on file     Social Determinants of Health     Financial Resource Strain:     Difficulty of Paying Living Expenses:    Food Insecurity:     Worried About Running Out of Food in the Last Year:     [address] in the Last Year:    Transportation Needs:     Lack of Transportation (Medical):      Lack of Transportation (Non-Medical):    Physical Activity:     Days of Exercise per Week:     Minutes of Exercise per Session:    Stress:     Feeling of Stress :    Social Connections:     Frequency of Communication with Friends and Family:     Frequency of Social Gatherings with Friends and Family:     Attends Jehovah's witness Services:     Active Member of Clubs or Organizations:     Attends Club or Organization Meetings:     Marital Status:    Intimate Partner Violence:     Fear of Current or Ex-Partner:     Emotionally Abused:     Physically Abused:     Sexually Abused:        Current Outpatient Medications   Medication Sig Dispense Refill    Phenazopyridine HCl (AZO TABS PO) Take by mouth      ibuprofen (ADVIL;MOTRIN) 200 MG tablet Take 200 mg by mouth every 6 hours as needed for Pain      fluconazole (DIFLUCAN) 150 MG tablet Take 1 po now. Repeat when antibiotic complete (Patient not taking: Reported on 8/31/2021) 2 tablet 0     No current facility-administered medications for this visit. Allergies   Allergen Reactions    Pcn [Penicillins] Hives       REVIEW OF SYSTEMS:  General: No fevers, chills, change in weight  HEENT: No double vision, blurry vision, runny nose, sore throat, tinnitus  Cardio: No chest pain, palpitations, RUSS, edema, PND  Pulmonary: No cough, hemoptysis, SOB  GI: No nausea, vomiting, dysphagia, odynophagia, diarrhea, constipation.   : Has dysuria, frequency and urgency, No hematuria orincontinence  Musculoskeletal:Low back betty the past 2-3 days No other muscle or joint aches, no joint swelling  Neuro: No dizziness/lightheadedness, no seizures  Endocrine: No polyuria, polydipsia, polyphagia, no temperature intolerance  Skin: No lesions or itching  No problems with ADLs  Sleep: good  Psychiatric: No depression or anxiety    PHYSICAL EXAM:  VS:  /64 (Site: Right Upper Arm, Position: Sitting, Cuff Size: Large Adult)   Pulse 74   Temp 98.5 °F (36.9 °C) (Tympanic)   Ht 5' 5\" (1.651 m)   Wt 181 lb (82.1 kg)   LMP 08/09/2021   SpO2 97%   Breastfeeding No   BMI 30.12 kg/m²   General:  Alert and oriented, NAD  HEENT: TMs, MERARY, EOMI, Conjunctivae clear       Throat currently clear. NECK:  Supple without adenopathy or thyromegaly, no carotid bruits  LUNGS:  CTA all fields  HEART:  RRR without M, R, or G  ABDOMEN:  Soft and nontender without palpable abnormalities  EXTREMITIES:  Without clubbing, cyanosis, or edema, no calf tenderness  NEURO:  No focal deficits. SKIN:  warm to touch,normal texture. No active lesions. ASSESSMENT/PLAN:     Diagnosis Orders   1. Urinary tract infection without hematuria, site unspecified     2. Acute midline low back pain without sciatica         No orders of the defined types were placed in this encounter. Requested Prescriptions     Signed Prescriptions Disp Refills    ciprofloxacin (CIPRO) 500 MG tablet 14 tablet 0     Sig: Take 1 tablet by mouth 2 times daily for 7 days   Start on Cipro 500 mg bid x 7days. Await C &S  Plenty of fluids. Tylenol prn pain. F/u with PCP    Return if symptoms worsen or fail to improve.     Electronically signed by Johnathan Nunez MD

## 2021-08-31 NOTE — TELEPHONE ENCOUNTER
Elissa Barakat called requesting a refill of the below medication which has been pended for you:     Requested Prescriptions     Pending Prescriptions Disp Refills    fluconazole (DIFLUCAN) 150 MG tablet 2 tablet 0     Sig: Take 1 po now.   Repeat when antibiotic complete       Last Appointment Date: 8/31/2021  Next Appointment Date: Visit date not found    Allergies   Allergen Reactions    Pcn [Penicillins] Hives

## 2021-08-31 NOTE — TELEPHONE ENCOUNTER
Received call from [address] [facility] [address] at [facility] with [employer] Complaint. Brief description of triage: flank pain    Triage indicates for patient to be seen today by pcp if appt not available to be seen at walk in or [facility]. Care advice provided, patient verbalizes understanding; denies any other questions or concerns; instructed to call back for any new or worsening symptoms. Writer provided warm transfer to  at [facility] for appointment scheduling. Attention Provider: Thank you for allowing me to participate in the care of your patient. The patient was connected to triage in response to information provided to the [facility]. Please do not respond through this encounter as the response is not directed to a shared pool. Reason for Disposition   Pain or burning with passing urine (urination)    Answer Assessment - Initial Assessment Questions  1. LOCATION: \"Where does it hurt? \" (e.g., left, right)      Lower back left and right side, left is worse    2. ONSET: \"When did the pain start? \"      2 days ago    3. SEVERITY: \"How bad is the pain? \" (e.g., Scale 1-10; mild, moderate, or severe)    - MILD (1-3): doesn't interfere with normal activities     - MODERATE (4-7): interferes with normal activities or awakens from sleep     - SEVERE (8-10): excruciating pain and patient unable to do normal activities (stays in bed)        Moderate    4. PATTERN: \"Does the pain come and go, or is it constant? \"      Comes and goes    5. CAUSE: \"What do you think is causing the pain? \"      uti    6. OTHER SYMPTOMS:  \"Do you have any other symptoms? \" (e.g., fever, abdominal pain, vomiting, leg weakness, burning with urination, blood in urine)   cramping, burning with uriantion    7. PREGNANCY:  \"Is there any chance you are pregnant? \" \"When was your last /menstrual period? \"     No, 8/9    Protocols used:  FLANK PAIN-ADULT-[place]

## 2021-08-31 NOTE — TELEPHONE ENCOUNTER
calling wanting this note sent to [facility] so it can be filled today as he states pt cannot start the antibiotic until she gets this med, please advise.

## 2021-08-31 NOTE — TELEPHONE ENCOUNTER
From: Mason Toledo  To: Renetta Birmingham MD  Sent: 8/31/2021 2:50 PM EDT  Subject: Prescription Question    Hi, thank you for seeing me today. The nurse told me I would be prescribed Diflucan, like in the past, to help prevent a yeast infection with the antibiotics. But when I went to the pharmacy it was never called in. Can I please get that prescribed. Since I have to take that with my first dose of antibiotics, I need it soon!

## 2021-09-01 LAB
CULTURE: ABNORMAL
Lab: ABNORMAL
SPECIMEN DESCRIPTION: ABNORMAL

## 2022-01-03 ENCOUNTER — TELEPHONE (OUTPATIENT)
Dept: FAMILY MEDICINE CLINIC | Age: 27
End: 2022-01-03

## 2022-01-03 NOTE — TELEPHONE ENCOUNTER
Pt calling stating she had covid symptoms starting on the 29th, took a positive home Covid test on the 31st, but pt is now needing a return to work slip, do you want to do a VV or can we write note, please advise.

## 2022-01-07 ENCOUNTER — VIRTUAL VISIT (OUTPATIENT)
Dept: FAMILY MEDICINE CLINIC | Age: 27
End: 2022-01-07

## 2022-01-07 DIAGNOSIS — U07.1 COVID-19: Primary | ICD-10-CM

## 2022-01-07 PROCEDURE — 99213 OFFICE O/P EST LOW 20 MIN: CPT | Performed by: PHYSICIAN ASSISTANT

## 2022-01-07 PROCEDURE — 99211 OFF/OP EST MAY X REQ PHY/QHP: CPT | Performed by: PHYSICIAN ASSISTANT

## 2022-01-07 SDOH — ECONOMIC STABILITY: TRANSPORTATION INSECURITY
IN THE PAST 12 MONTHS, HAS THE LACK OF TRANSPORTATION KEPT YOU FROM MEDICAL APPOINTMENTS OR FROM GETTING MEDICATIONS?: NO

## 2022-01-07 SDOH — ECONOMIC STABILITY: TRANSPORTATION INSECURITY
IN THE PAST 12 MONTHS, HAS LACK OF TRANSPORTATION KEPT YOU FROM MEETINGS, WORK, OR FROM GETTING THINGS NEEDED FOR DAILY LIVING?: NO

## 2022-01-07 SDOH — ECONOMIC STABILITY: FOOD INSECURITY: WITHIN THE PAST 12 MONTHS, YOU WORRIED THAT YOUR FOOD WOULD RUN OUT BEFORE YOU GOT MONEY TO BUY MORE.: NEVER TRUE

## 2022-01-07 SDOH — ECONOMIC STABILITY: FOOD INSECURITY: WITHIN THE PAST 12 MONTHS, THE FOOD YOU BOUGHT JUST DIDN'T LAST AND YOU DIDN'T HAVE MONEY TO GET MORE.: NEVER TRUE

## 2022-01-07 ASSESSMENT — ENCOUNTER SYMPTOMS
COUGH: 0
GASTROINTESTINAL NEGATIVE: 1
SHORTNESS OF BREATH: 0

## 2022-01-07 ASSESSMENT — SOCIAL DETERMINANTS OF HEALTH (SDOH): HOW HARD IS IT FOR YOU TO PAY FOR THE VERY BASICS LIKE FOOD, HOUSING, MEDICAL CARE, AND HEATING?: NOT HARD AT ALL

## 2022-01-07 NOTE — PROGRESS NOTES
Tatum Dwyer is a 27 y.o. female that presents for Other (covid positve started on 29th)      This visit was performed via a video visit in patient home. Provider performing visit from office with assistance of nursing personnel, Rob Liu LPN.    HPI:  Patient is seen for RTW evaluation. Patient says on 12/29/2021, she developed sore throat. She then had arthralgia, chills and fever. She had cough with congestion and fatigue. Patient had a positive home COVID test on 1/1/2022. She has some residual sinus congestion. She denies any continuation of fever. She denies dyspnea or wheezing. She denies any additional concerns or complaints today. I have reviewed the patient's past medical history, past surgical history, allergies,medications, social and family history and I have made updates where appropriate. Past Medical History:   Diagnosis Date    Concussion     h/o, x 7, athletics    COVID-19        History reviewed. No pertinent surgical history. Social History     Tobacco Use    Smoking status: Never Smoker    Smokeless tobacco: Never Used   Vaping Use    Vaping Use: Never used   Substance Use Topics    Alcohol use: Never    Drug use: Never       Family History   Problem Relation Age of Onset    Thyroid Disease Mother     Prostate Cancer Father         prostate removed    No Known Problems Sister     No Known Problems Brother     Other Maternal Grandmother         brain aneurysm    No Known Problems Maternal Grandfather     Lung Cancer Paternal Grandmother     Cancer Paternal Grandfather         liver    No Known Problems Brother          Review of Systems   Constitutional: Positive for fatigue (resolved) and fever (resolved). HENT: Positive for congestion. Respiratory: Negative for cough and shortness of breath. Cardiovascular: Negative. Gastrointestinal: Negative. PHYSICAL EXAM:    Physical Exam  HENT:      Head: Normocephalic.    Pulmonary:

## 2022-01-07 NOTE — LETTER
[facility] department of [place] [address]  Phone: [phone]  Fax: [phone]    [address] [place]        January 7, 2022     Patient: Gonzalo Franz   YOB: 1995   Date of Visit: 1/7/2022       To Whom It May Concern: It is my medical opinion that Gonzalo Franz may return to work on 1/10/2022. If you have any questions or concerns, please don't hesitate to call.     Sincerely,        FAY Noyola

## 2022-01-12 ENCOUNTER — HOSPITAL ENCOUNTER (OUTPATIENT)
Age: 27
Setting detail: SPECIMEN
Discharge: HOME | End: 2022-01-12
Payer: COMMERCIAL

## 2022-01-12 ENCOUNTER — OFFICE VISIT (OUTPATIENT)
Dept: OBGYN | Age: 27
End: 2022-01-12
Payer: COMMERCIAL

## 2022-01-12 VITALS
HEART RATE: 80 BPM | OXYGEN SATURATION: 98 % | HEIGHT: 65 IN | WEIGHT: 180.8 LBS | SYSTOLIC BLOOD PRESSURE: 120 MMHG | BODY MASS INDEX: 30.12 KG/M2 | DIASTOLIC BLOOD PRESSURE: 72 MMHG

## 2022-01-12 DIAGNOSIS — Z11.3 ROUTINE SCREENING FOR STI (SEXUALLY TRANSMITTED INFECTION): ICD-10-CM

## 2022-01-12 DIAGNOSIS — Z01.419 WOMEN'S ANNUAL ROUTINE GYNECOLOGICAL EXAMINATION: Primary | ICD-10-CM

## 2022-01-12 PROCEDURE — 87591 N.GONORRHOEAE DNA AMP PROB: CPT

## 2022-01-12 PROCEDURE — 99395 PREV VISIT EST AGE 18-39: CPT | Performed by: ADVANCED PRACTICE MIDWIFE

## 2022-01-12 PROCEDURE — 87491 CHLMYD TRACH DNA AMP PROBE: CPT

## 2022-01-12 ASSESSMENT — PATIENT HEALTH QUESTIONNAIRE - PHQ9
SUM OF ALL RESPONSES TO PHQ9 QUESTIONS 1 & 2: 0
1. LITTLE INTEREST OR PLEASURE IN DOING THINGS: 0
SUM OF ALL RESPONSES TO PHQ QUESTIONS 1-9: 0
2. FEELING DOWN, DEPRESSED OR HOPELESS: 0

## 2022-01-12 ASSESSMENT — ENCOUNTER SYMPTOMS
GASTROINTESTINAL NEGATIVE: 1
RESPIRATORY NEGATIVE: 1
EYES NEGATIVE: 1

## 2022-01-12 NOTE — PROGRESS NOTES
Subjective:      Patient ID: Janice Dubon  is a 27 y.o. y.o. female. Kristy Irene presents today for annual examination. She reports intermittent itching and burning on external genitalia near clitoris. It lasted for 5 days, she used a vitamin E salve and it cleared, No new sexually partners. She is sexually active and no pain or bleeding with intercourse. She reports monthly menses and bleeding for 6-7 requiring a super tampon change every 4 hours, no clots and menstrual pain she would rate 7/10 the first two days. She uses ibuprofen for the pain. She and her partner are hoping to conceive. Review of Systems   Constitutional: Negative. HENT: Negative. Eyes: Negative. Respiratory: Negative. Cardiovascular: Negative. Gastrointestinal: Negative. Genitourinary: Negative. Musculoskeletal: Negative. Skin: Negative. Neurological: Negative. Psychiatric/Behavioral: Negative. Breast ROS: negative    Objective:   /72 (Site: Right Upper Arm, Position: Sitting, Cuff Size: Medium Adult)   Pulse 80   Ht 5' 5\" (1.651 m)   Wt 180 lb 12.8 oz (82 kg)   LMP 12/14/2021 (Exact Date)   SpO2 98%   Breastfeeding No   BMI 30.09 kg/m²   Physical Exam  Constitutional:       Appearance: She is well-developed. HENT:      Head: Normocephalic and atraumatic. Eyes:      Conjunctiva/sclera: Conjunctivae normal.   Cardiovascular:      Rate and Rhythm: Normal rate and regular rhythm. Heart sounds: Normal heart sounds. Pulmonary:      Effort: Pulmonary effort is normal.      Breath sounds: Normal breath sounds. Chest:   Breasts: Breasts are symmetrical.      Right: No inverted nipple, mass, nipple discharge, skin change or tenderness. Left: No inverted nipple, mass, nipple discharge, skin change or tenderness. Abdominal:      Palpations: Abdomen is soft. Genitourinary:     General: Normal vulva. Vagina: Normal.      Comments: External genitalia WNL, no lesions noted. Vaginal canal is pink with rugae presents and normal appearing nonodorous discharge. Cervix is nulliparous, freely mobile and nontender. Uterus is NSSAVNT, adnexa are small, freely mobile and nontender. Musculoskeletal:         General: Normal range of motion. Cervical back: Normal range of motion and neck supple. Skin:     General: Skin is warm and dry. Comments: Moderate hirsutism, nipples, midline between umbilicus and mons, facial.   Neurological:      Mental Status: She is alert and oriented to person, place, and time. Deep Tendon Reflexes: Reflexes are normal and symmetric. Psychiatric:         Mood and Affect: Mood normal.         Thought Content: Thought content normal.         Sexually active: Yes  Any bleeding or pain withintercourse: No  Last Yearly:  10/2020  Last Pap: 10/2020  Do you do self breast exams: Yes    Assessment:      Diagnosis Orders   1. Women's annual routine gynecological examination     2. Routine screening for STI (sexually transmitted infection)  C.trachomatis N.gonorrhoeae DNA           Plan:      Orders Placed This Encounter   Procedures    C.trachomatis N.gonorrhoeae DNA     Standing Status:   Future     Standing Expiration Date:   2/12/2022     Education: preconceptual issues, PNV daily, timing of intercourse, remain laying down for 30 minutes post coital, diet, exercise and hydration. RTO 12 months and prn.

## 2022-01-13 LAB
C TRACH DNA GENITAL QL NAA+PROBE: NEGATIVE
N. GONORRHOEAE DNA: NEGATIVE
SPECIMEN DESCRIPTION: NORMAL

## 2022-04-26 ENCOUNTER — PATIENT MESSAGE (OUTPATIENT)
Dept: OBGYN | Age: 27
End: 2022-04-26

## 2022-04-26 ENCOUNTER — HOSPITAL ENCOUNTER (OUTPATIENT)
Dept: INTERVENTIONAL RADIOLOGY/VASCULAR | Age: 27
Discharge: HOME | End: 2022-04-28
Payer: COMMERCIAL

## 2022-04-26 ENCOUNTER — OFFICE VISIT (OUTPATIENT)
Dept: OBGYN | Age: 27
End: 2022-04-26
Payer: COMMERCIAL

## 2022-04-26 ENCOUNTER — HOSPITAL ENCOUNTER (OUTPATIENT)
Age: 27
Setting detail: SPECIMEN
Discharge: HOME | End: 2022-04-26
Payer: COMMERCIAL

## 2022-04-26 VITALS
BODY MASS INDEX: 30.89 KG/M2 | OXYGEN SATURATION: 98 % | DIASTOLIC BLOOD PRESSURE: 68 MMHG | HEIGHT: 65 IN | HEART RATE: 81 BPM | SYSTOLIC BLOOD PRESSURE: 118 MMHG | WEIGHT: 185.4 LBS

## 2022-04-26 DIAGNOSIS — N83.9 DISORDER OF OVULATION: Primary | ICD-10-CM

## 2022-04-26 DIAGNOSIS — E88.81 INSULIN RESISTANCE SYNDROME: ICD-10-CM

## 2022-04-26 DIAGNOSIS — Z31.41 FERTILITY TESTING: ICD-10-CM

## 2022-04-26 DIAGNOSIS — N83.9 DISORDER OF OVULATION: ICD-10-CM

## 2022-04-26 DIAGNOSIS — Z31.69 ENCOUNTER FOR PRECONCEPTION CONSULTATION: ICD-10-CM

## 2022-04-26 PROCEDURE — 87591 N.GONORRHOEAE DNA AMP PROB: CPT

## 2022-04-26 PROCEDURE — 76856 US EXAM PELVIC COMPLETE: CPT

## 2022-04-26 PROCEDURE — 99214 OFFICE O/P EST MOD 30 MIN: CPT | Performed by: ADVANCED PRACTICE MIDWIFE

## 2022-04-26 PROCEDURE — 87491 CHLMYD TRACH DNA AMP PROBE: CPT

## 2022-04-26 RX ORDER — METFORMIN HYDROCHLORIDE 500 MG/1
500 TABLET, EXTENDED RELEASE ORAL
Qty: 30 TABLET | Refills: 5 | Status: SHIPPED
Start: 2022-04-26 | End: 2022-05-25 | Stop reason: DRUGHIGH

## 2022-04-26 ASSESSMENT — ENCOUNTER SYMPTOMS
EYES NEGATIVE: 1
RESPIRATORY NEGATIVE: 1
GASTROINTESTINAL NEGATIVE: 1

## 2022-04-26 NOTE — PROGRESS NOTES
Subjective:      Patient ID: Regine Herrera  is a 27 y.o. y.o. female. Devyn Wilson presents today with her mother. She reports menses are irregular, ranging from 28 - 64 days and bleeding for 7 days. She uses a super tampon the first three days, changing every 3 hours and then reduced to regular tampon every 4 hours. She is  and they have been trying to conceive for the past 6 months. She reports intercourse 3-4 x/weeks, and she remains laying down for 10-20 minutes post coital. He spouse has ulcertive colitis and has developed anejaculatory orgasm, and has been evaluated for retrograde ejaculation that was negative. His UC and urology care is at the [facility]. She and he are in the process of infertility work-up. She desires her testing locally. Review of Systems   Constitutional: Negative. HENT: Negative. Eyes: Negative. Respiratory: Negative. Cardiovascular: Negative. Gastrointestinal: Negative. Genitourinary: Positive for menstrual problem. Musculoskeletal: Negative. Skin: Negative. Neurological: Negative. Psychiatric/Behavioral: Negative. Breast ROS: negative    Objective:   /68 (Site: Right Upper Arm, Position: Sitting)   Pulse 81   Ht 5' 5\" (1.651 m)   Wt 185 lb 6.4 oz (84.1 kg)   LMP 04/13/2022 (Exact Date)   SpO2 98%   Breastfeeding No   BMI 30.85 kg/m²   Physical Exam  Constitutional:       Appearance: She is well-developed. HENT:      Head: Normocephalic and atraumatic. Eyes:      Conjunctiva/sclera: Conjunctivae normal.   Cardiovascular:      Rate and Rhythm: Normal rate. Pulmonary:      Effort: Pulmonary effort is normal.   Abdominal:      Palpations: Abdomen is soft. Genitourinary:     Vagina: Normal.      Comments: Pelvic deferred. Musculoskeletal:         General: Normal range of motion. Cervical back: Normal range of motion and neck supple. Skin:     General: Skin is warm and dry.    Neurological:      Mental Status: She is alert and oriented to person, place, and time. Deep Tendon Reflexes: Reflexes are normal and symmetric. Psychiatric:         Mood and Affect: Mood normal.         Thought Content: Thought content normal.           Assessment:      Diagnosis Orders   1. Disorder of ovulation  Luteinizing Hormone    Follicle Stimulating Hormone    Estradiol    Prolactin    DHEA-Sulfate    TSH    US PELVIS COMPLETE NON-OB TRANSABDOMINAL AND TRANSVAGINAL    Hemoglobin A1C    T4, Free    Glucose, Fasting    Testosterone, Free    Insulin, Total    [facility], DO, OB/GYN, [place]   2. Insulin resistance syndrome  Luteinizing Hormone    Follicle Stimulating Hormone    Estradiol    Prolactin    DHEA-Sulfate    TSH    US PELVIS COMPLETE NON-OB TRANSABDOMINAL AND TRANSVAGINAL    Hemoglobin A1C    T4, Free    Glucose, Fasting    Testosterone, Free    Insulin, Total    [facility], DO, OB/GYN, [place]    metFORMIN (GLUCOPHAGE-XR) 500 MG extended release tablet   3.  Encounter for preconception consultation  Rubella Antibody, IgG    Varicella Zoster Antibody, IgG    C.trachomatis N.gonorrhoeae DNA, Urine    MTHFR Mutation           Plan:     Orders Placed This Encounter   Procedures    C.trachomatis N.gonorrhoeae DNA, Urine     Standing Status:   Future     Number of Occurrences:   1     Standing Expiration Date:   10/26/2022    US PELVIS COMPLETE NON-OB TRANSABDOMINAL AND TRANSVAGINAL     Standing Status:   Future     Standing Expiration Date:   4/26/2023    Luteinizing Hormone     Standing Status:   Future     Standing Expiration Date:   5/80/3509    Follicle Stimulating Hormone     Standing Status:   Future     Standing Expiration Date:   4/26/2023    Estradiol     Standing Status:   Future     Standing Expiration Date:   4/26/2023    Prolactin     Standing Status:   Future     Standing Expiration Date:   4/26/2023    DHEA-Sulfate     Standing Status:   Future     Standing Expiration Date: 4/26/2023    TSH     Standing Status:   Future     Standing Expiration Date:   4/26/2023    Hemoglobin A1C     Standing Status:   Future     Standing Expiration Date:   4/27/2023    T4, Free     Standing Status:   Future     Standing Expiration Date:   4/26/2023    Glucose, Fasting     Standing Status:   Future     Standing Expiration Date:   4/26/2023    Testosterone, Free     Standing Status:   Future     Standing Expiration Date:   4/26/2023    Insulin, Total     Standing Status:   Future     Standing Expiration Date:   4/26/2023    Rubella Antibody, IgG     Standing Status:   Future     Standing Expiration Date:   4/26/2023    Varicella Zoster Antibody, IgG     Standing Status:   Future     Standing Expiration Date:   4/26/2023    MTHFR Mutation     Standing Status:   Future     Standing Expiration Date:   4/26/2023     Order Specific Question:   MTHFR PCR Specimen     Answer:   jelani Yousif DO, OB/GYN, [place]     Referral Priority:   Routine     Referral Type:   Eval and Treat     Referral Reason:   Patient Preference     Referred to Provider:   Kaylynn Vargas DO     Requested Specialty:   Obstetrics & Gynecology     Number of Visits Requested:   1   Will obtain testing for insulin resistance and immunity status of rubella and varicella. Will refer to gyn for HSG. Infertility packet with Dr. Shakira Iglesias given. Thirty minutes spent in education, evaluation and assessment.

## 2022-04-27 LAB
C. TRACHOMATIS DNA ,URINE: NEGATIVE
N. GONORRHOEAE DNA, URINE: NEGATIVE
SPECIMEN DESCRIPTION: NORMAL

## 2022-04-30 ENCOUNTER — HOSPITAL ENCOUNTER (OUTPATIENT)
Dept: LAB | Age: 27
Discharge: HOME | End: 2022-04-30
Payer: COMMERCIAL

## 2022-04-30 DIAGNOSIS — N83.9 DISORDER OF OVULATION: ICD-10-CM

## 2022-04-30 DIAGNOSIS — Z31.69 ENCOUNTER FOR PRECONCEPTION CONSULTATION: ICD-10-CM

## 2022-04-30 DIAGNOSIS — E88.81 INSULIN RESISTANCE SYNDROME: ICD-10-CM

## 2022-04-30 LAB
ESTRADIOL LEVEL: 35.5 PG/ML (ref 27–314)
FOLLICLE STIMULATING HORMONE: 6.2 MIU/ML (ref 1.7–21.5)
GLUCOSE FASTING: 96 MG/DL (ref 70–99)
INSULIN REFERENCE RANGE:: NORMAL
INSULIN: 11 MU/L
LH: 15.2 MIU/ML (ref 1–95.6)
PROLACTIN: 16.8 NG/ML (ref 4.79–23.3)
RUBV IGG SER QL: 93 IU/ML
SEX HORMONE BINDING GLOBULIN: 30 NMOL/L (ref 30–135)
TESTOSTERONE FREE-NONMALE: 6.4 PG/ML (ref 0.8–7.4)
TESTOSTERONE TOTAL: 34 NG/DL (ref 20–70)
THYROXINE, FREE: 1.21 NG/DL (ref 0.93–1.7)
TSH SERPL DL<=0.05 MIU/L-ACNC: 1.38 UIU/ML (ref 0.3–5)

## 2022-04-30 PROCEDURE — 82947 ASSAY GLUCOSE BLOOD QUANT: CPT

## 2022-04-30 PROCEDURE — 84146 ASSAY OF PROLACTIN: CPT

## 2022-04-30 PROCEDURE — 82627 DEHYDROEPIANDROSTERONE: CPT

## 2022-04-30 PROCEDURE — 86787 VARICELLA-ZOSTER ANTIBODY: CPT

## 2022-04-30 PROCEDURE — 84443 ASSAY THYROID STIM HORMONE: CPT

## 2022-04-30 PROCEDURE — 82670 ASSAY OF TOTAL ESTRADIOL: CPT

## 2022-04-30 PROCEDURE — 84439 ASSAY OF FREE THYROXINE: CPT

## 2022-04-30 PROCEDURE — 86762 RUBELLA ANTIBODY: CPT

## 2022-04-30 PROCEDURE — 83001 ASSAY OF GONADOTROPIN (FSH): CPT

## 2022-04-30 PROCEDURE — 81291 MTHFR GENE: CPT

## 2022-04-30 PROCEDURE — 83525 ASSAY OF INSULIN: CPT

## 2022-04-30 PROCEDURE — 84403 ASSAY OF TOTAL TESTOSTERONE: CPT

## 2022-04-30 PROCEDURE — 83002 ASSAY OF GONADOTROPIN (LH): CPT

## 2022-04-30 PROCEDURE — 83036 HEMOGLOBIN GLYCOSYLATED A1C: CPT

## 2022-04-30 PROCEDURE — 36415 COLL VENOUS BLD VENIPUNCTURE: CPT

## 2022-04-30 PROCEDURE — 84270 ASSAY OF SEX HORMONE GLOBUL: CPT

## 2022-05-01 LAB
ESTIMATED AVERAGE GLUCOSE: 100 MG/DL
HBA1C MFR BLD: 5.1 % (ref 4–6)

## 2022-05-02 LAB
DHEAS (DHEA SULFATE): 249 UG/DL (ref 65–380)
VZV IGG SER QL IA: 1.61

## 2022-05-04 ENCOUNTER — PATIENT MESSAGE (OUTPATIENT)
Dept: OBGYN | Age: 27
End: 2022-05-04

## 2022-05-04 LAB
MTHFR INTERPRETATION: ABNORMAL
MTHFR MUTATION A1286C: ABNORMAL
MTHFR MUTATION C665T: NEGATIVE
SPECIMEN: ABNORMAL

## 2022-05-10 ENCOUNTER — PATIENT MESSAGE (OUTPATIENT)
Dept: OBGYN | Age: 27
End: 2022-05-10

## 2022-05-13 ENCOUNTER — TELEPHONE (OUTPATIENT)
Dept: OBGYN | Age: 27
End: 2022-05-13

## 2022-05-13 NOTE — LETTER
[address] [facility] A department of [place] [address]  Phone: [phone]  Fax: DAMARIS Aguirre CNM        May 16, 2022    Kandis Rosen  [address] [facility]      Dear Shelley Jerry:    Please give our office a call at [phone] so we may discuss further information regarding your HSG. If you have any questions or concerns, please don't hesitate to call.     Sincerely,        DAMARIS Mckoy CNM

## 2022-05-23 NOTE — TELEPHONE ENCOUNTER
Patient notified to contact [facility] to see if they will accept an HSG from us or if they would like their own, patient states she will do this.

## 2022-05-25 DIAGNOSIS — N83.9 DISORDER OF OVULATION: Primary | ICD-10-CM

## 2022-05-25 DIAGNOSIS — E88.81 INSULIN RESISTANCE SYNDROME: ICD-10-CM

## 2022-05-25 RX ORDER — METFORMIN HYDROCHLORIDE 750 MG/1
750 TABLET, EXTENDED RELEASE ORAL
Qty: 30 TABLET | Refills: 3 | Status: SHIPPED | OUTPATIENT
Start: 2022-05-25

## 2023-01-12 ENCOUNTER — OFFICE VISIT (OUTPATIENT)
Dept: OBGYN | Age: 28
End: 2023-01-12

## 2023-01-12 VITALS
BODY MASS INDEX: 30.59 KG/M2 | DIASTOLIC BLOOD PRESSURE: 84 MMHG | SYSTOLIC BLOOD PRESSURE: 124 MMHG | HEIGHT: 65 IN | HEART RATE: 76 BPM | WEIGHT: 183.6 LBS

## 2023-01-12 DIAGNOSIS — E88.81 INSULIN RESISTANCE SYNDROME: ICD-10-CM

## 2023-01-12 DIAGNOSIS — Z01.419 WOMEN'S ANNUAL ROUTINE GYNECOLOGICAL EXAMINATION: Primary | ICD-10-CM

## 2023-01-12 RX ORDER — METFORMIN HYDROCHLORIDE 750 MG/1
750 TABLET, EXTENDED RELEASE ORAL
Qty: 90 TABLET | Refills: 3 | Status: SHIPPED | OUTPATIENT
Start: 2023-01-12

## 2023-01-12 ASSESSMENT — PATIENT HEALTH QUESTIONNAIRE - PHQ9
SUM OF ALL RESPONSES TO PHQ QUESTIONS 1-9: 0
1. LITTLE INTEREST OR PLEASURE IN DOING THINGS: 0
2. FEELING DOWN, DEPRESSED OR HOPELESS: 0
SUM OF ALL RESPONSES TO PHQ9 QUESTIONS 1 & 2: 0

## 2023-01-12 ASSESSMENT — ENCOUNTER SYMPTOMS
EYES NEGATIVE: 1
RESPIRATORY NEGATIVE: 1
GASTROINTESTINAL NEGATIVE: 1

## 2023-01-12 NOTE — PROGRESS NOTES
Subjective:      Patient ID: Linnea Kong  is a 24 y.o. y.o. female. Golda Lanes presents today for an annual examination. She reports monthly menses, bleeding for 6-7 days and requiring a super tampon change every 2-3 hours for the first 2 days then reducing to a regular tampon change every 3-4 hours. She reports menstrual cramping the first two days she would rate 6-7/10 and uses ibuprofen 800 mg. Q8H for the pain. She is  and sexually active, she reports no pain or bleeding with intercourse. She and her spouse are trying to conceive, are under care at [facility] and considering IVF. She has no worries or concerns. Review of Systems   Constitutional: Negative. HENT: Negative. Eyes: Negative. Respiratory: Negative. Cardiovascular: Negative. Gastrointestinal: Negative. Genitourinary: Negative. Musculoskeletal: Negative. Skin: Negative. Neurological: Negative. Psychiatric/Behavioral: Negative. Breast ROS: negative    Objective:   /84   Pulse 76   Ht 5' 5\" (1.651 m)   Wt 183 lb 9.6 oz (83.3 kg)   LMP 12/23/2022 (Exact Date)   BMI 30.55 kg/m²   Physical Exam  Constitutional:       Appearance: She is well-developed. HENT:      Head: Normocephalic and atraumatic. Eyes:      Conjunctiva/sclera: Conjunctivae normal.   Cardiovascular:      Rate and Rhythm: Normal rate and regular rhythm. Heart sounds: Normal heart sounds. Pulmonary:      Effort: Pulmonary effort is normal.      Breath sounds: Normal breath sounds. Chest:   Breasts:     Breasts are symmetrical.      Right: No inverted nipple, mass, nipple discharge, skin change or tenderness. Left: No inverted nipple, mass, nipple discharge, skin change or tenderness. Abdominal:      Palpations: Abdomen is soft. Genitourinary:     General: Normal vulva. Vagina: Normal.      Rectum: Normal.      Comments: External genitalia WNL, no lesions noted.  Vaginal canal is pink with rugae present and normal appearing, nonodorous discharge. Cervix is small, nulliparous, freely mobile and nontender. Uterus is NSSAVNT, adnexa are small, freely mobile and nontender. No abnormalities noted. Musculoskeletal:         General: Normal range of motion. Cervical back: Normal range of motion and neck supple. Skin:     General: Skin is warm and dry. Comments: Moderate hirsutism. Neurological:      Mental Status: She is alert and oriented to person, place, and time. Deep Tendon Reflexes: Reflexes are normal and symmetric. Psychiatric:         Mood and Affect: Mood normal.         Thought Content: Thought content normal.       Sexually active: Yes  Any bleeding or pain withintercourse: No  Last Yearly:  10/2020  Last Pap:10/2020  Negative ISAAC  Negative H/O Abnormal Pap smear  Do you do self breast exams: Yes  Negative H/O Breast, Uterine or Ovarian Cancer  Assessment:      Diagnosis Orders   1. Women's annual routine gynecological examination        2. Insulin resistance syndrome                Plan:       Education: discussed and reviewed diet with hydration, calcium intake 8565-7543 mg./day and weight bearing exercise 30-50 mionutes 3-5x/week.  PNV or women's one a day vitamin daily RTO 12 months and prn

## 2023-02-16 ENCOUNTER — OFFICE VISIT (OUTPATIENT)
Dept: PRIMARY CARE CLINIC | Age: 28
End: 2023-02-16
Payer: COMMERCIAL

## 2023-02-16 VITALS
TEMPERATURE: 97.8 F | OXYGEN SATURATION: 97 % | DIASTOLIC BLOOD PRESSURE: 86 MMHG | SYSTOLIC BLOOD PRESSURE: 122 MMHG | RESPIRATION RATE: 18 BRPM | HEART RATE: 84 BPM | BODY MASS INDEX: 32.44 KG/M2 | HEIGHT: 64 IN | WEIGHT: 190 LBS

## 2023-02-16 DIAGNOSIS — J01.40 ACUTE NON-RECURRENT PANSINUSITIS: Primary | ICD-10-CM

## 2023-02-16 PROCEDURE — 99213 OFFICE O/P EST LOW 20 MIN: CPT

## 2023-02-16 RX ORDER — CEFDINIR 300 MG/1
300 CAPSULE ORAL 2 TIMES DAILY
Qty: 20 CAPSULE | Refills: 0 | Status: SHIPPED | OUTPATIENT
Start: 2023-02-16 | End: 2023-02-26

## 2023-02-16 RX ORDER — FLUCONAZOLE 150 MG/1
150 TABLET ORAL DAILY
Qty: 2 TABLET | Refills: 0 | Status: SHIPPED | OUTPATIENT
Start: 2023-02-16

## 2023-02-16 RX ORDER — PREDNISONE 20 MG/1
20 TABLET ORAL 2 TIMES DAILY
Qty: 10 TABLET | Refills: 0 | Status: SHIPPED | OUTPATIENT
Start: 2023-02-16 | End: 2023-02-21

## 2023-02-16 ASSESSMENT — ENCOUNTER SYMPTOMS
SINUS PAIN: 1
ABDOMINAL PAIN: 0
COUGH: 0
DIARRHEA: 0
CONSTIPATION: 0
NAUSEA: 0
SORE THROAT: 1
SINUS PRESSURE: 1
BLOOD IN STOOL: 0
SINUS COMPLAINT: 1
SHORTNESS OF BREATH: 0
WHEEZING: 0
RHINORRHEA: 0
VOMITING: 0

## 2023-02-16 ASSESSMENT — VISUAL ACUITY: OU: 1

## 2023-02-16 NOTE — PATIENT INSTRUCTIONS
Take full course of antibiotic    Recommended over the counter medications/treatments: The use of an antihistamine (Claritin or Zyrtec) and a nasal steroid spray (Flonase or Nasacort) may help with sinus congestion and drainage. Mucinex will also help thin secretions and improve congestion. Works best if drinking plenty of water. Honey with or without Lemon may also help with coughing. Probiotics daily may help boost immune system. Alternating hot liquids with cold liquids helps to sooth sore throat  Use Acetaminophen (Tylenol) to help relieve fever, chills or body aches. If allowed, you may alternate using ibuprofen (Motrin) and Tylenol. Do not exceed 3,000mg of Tylenol in a 24 hour time period. Make sure to stay well hydrated by drinking plenty of water. Follow up with primary care provider in 1 to 2 days or as needed if no improvement or worsening of your symptoms.

## 2023-02-16 NOTE — PROGRESS NOTES
[facility] A department of [facility]  [address]  Phone: [phone]  Fax: [phone]      Arvin Troncoso is a 24 y.o. female who presents to the [facility] today for her medical conditions/complaints as noted below. Arvin Troncoso is c/o of Congestion (Ongoing for 3 weeks per patient, sore/scratchy throat, congestion, post nasal drip, ears are bothering her, sinus pain/pressure, yellow/green nasal drainage)          HPI:     Sinus Problem  This is a new problem. The current episode started 1 to 4 weeks ago (x3 weeks). The problem has been waxing and waning since onset. There has been no fever. Her pain is at a severity of 3/10. The pain is mild. Associated symptoms include congestion, headaches, sinus pressure and a sore throat. Pertinent negatives include no chills, coughing, ear pain, neck pain or shortness of breath. Treatments tried: mucinex, nyquil. The treatment provided mild relief. Past Medical History:   Diagnosis Date    Concussion     h/o, x 7, athletics    COVID-19         Allergies   Allergen Reactions    Pcn [Penicillins] Hives       Wt Readings from Last 3 Encounters:   02/16/23 190 lb (86.2 kg)   01/12/23 183 lb 9.6 oz (83.3 kg)   04/26/22 185 lb 6.4 oz (84.1 kg)     BP Readings from Last 3 Encounters:   02/16/23 122/86   01/12/23 124/84   04/26/22 118/68      Temp Readings from Last 3 Encounters:   02/16/23 97.8 °F (36.6 °C) (Tympanic)   08/31/21 98.5 °F (36.9 °C) (Tympanic)   05/12/21 97.3 °F (36.3 °C) (Temporal)     Pulse Readings from Last 3 Encounters:   02/16/23 84   01/12/23 76   04/26/22 81     SpO2 Readings from Last 3 Encounters:   02/16/23 97%   04/26/22 98%   01/12/22 98%       Subjective:      Review of Systems   Constitutional:  Negative for chills, fatigue and fever. HENT:  Positive for congestion, sinus pressure, sinus pain and sore throat. Negative for ear pain, postnasal drip and rhinorrhea. Respiratory:  Negative for cough, shortness of breath and wheezing. Cardiovascular:  Negative for chest pain and palpitations. Gastrointestinal:  Negative for abdominal pain, blood in stool, constipation, diarrhea, nausea and vomiting. Endocrine: Negative for polydipsia and polyuria. Genitourinary:  Negative for dysuria and hematuria. Musculoskeletal:  Negative for neck pain. Neurological:  Positive for headaches. Negative for dizziness and seizures. Hematological:  Negative for adenopathy. Does not bruise/bleed easily. Psychiatric/Behavioral:  Negative for dysphoric mood. The patient is not nervous/anxious. Objective:     Vitals:    02/16/23 0936   BP: 122/86   Site: Right Upper Arm   Position: Sitting   Cuff Size: Medium Adult   Pulse: 84   Resp: 18   Temp: 97.8 °F (36.6 °C)   TempSrc: Tympanic   SpO2: 97%   Weight: 190 lb (86.2 kg)   Height: 5' 4\" (1.626 m)     Body mass index is 32.61 kg/m². /86 (Site: Right Upper Arm, Position: Sitting, Cuff Size: Medium Adult)   Pulse 84   Temp 97.8 °F (36.6 °C) (Tympanic)   Resp 18   Ht 5' 4\" (1.626 m)   Wt 190 lb (86.2 kg)   SpO2 97%   BMI 32.61 kg/m²   Physical Exam  Vitals reviewed. Constitutional:       General: She is not in acute distress. HENT:      Head: Normocephalic. Right Ear: Hearing, tympanic membrane and ear canal normal.      Left Ear: Hearing, tympanic membrane and ear canal normal.      Nose: Mucosal edema, congestion and rhinorrhea present. Right Turbinates: Swollen. Left Turbinates: Swollen. Right Sinus: Maxillary sinus tenderness and frontal sinus tenderness present. Left Sinus: Maxillary sinus tenderness and frontal sinus tenderness present. Mouth/Throat:      Lips: Pink. Mouth: Mucous membranes are moist.      Pharynx: Uvula midline. Posterior oropharyngeal erythema present. No oropharyngeal exudate. Tonsils: No tonsillar exudate.    Eyes:      General: Vision grossly intact. Extraocular Movements: Extraocular movements intact. Conjunctiva/sclera: Conjunctivae normal.      Pupils: Pupils are equal, round, and reactive to light. Cardiovascular:      Rate and Rhythm: Normal rate and regular rhythm. Heart sounds: Normal heart sounds. No murmur heard. Pulmonary:      Effort: Pulmonary effort is normal. No tachypnea, accessory muscle usage or respiratory distress. Breath sounds: Normal breath sounds. Musculoskeletal:         General: No swelling. Cervical back: Full passive range of motion without pain, normal range of motion and neck supple. Lymphadenopathy:      Cervical: No cervical adenopathy. Neurological:      General: No focal deficit present. Mental Status: She is alert and oriented to person, place, and time. Psychiatric:         Mood and Affect: Mood normal.         Behavior: Behavior normal.       Assessment and Plan      Diagnosis Orders   1. Acute non-recurrent pansinusitis  cefdinir (OMNICEF) 300 MG capsule    predniSONE (DELTASONE) 20 MG tablet    fluconazole (DIFLUCAN) 150 MG tablet        Orders Placed This Encounter    cefdinir (OMNICEF) 300 MG capsule     Sig: Take 1 capsule by mouth 2 times daily for 10 days     Dispense:  20 capsule     Refill:  0    predniSONE (DELTASONE) 20 MG tablet     Sig: Take 1 tablet by mouth 2 times daily for 5 days     Dispense:  10 tablet     Refill:  0    fluconazole (DIFLUCAN) 150 MG tablet     Sig: Take 1 tablet by mouth daily Take one dose now and one at the end of treatment     Dispense:  2 tablet     Refill:  0     Pleasant 24year old presents with x3 weeks of sinus pain/pressure, nasal congestion and sore throat. Denies fevers at home, cough, chest pain or shortness of breath. -cefdinir x10 days   -prednisone x5 days  -diflucan as patient states she gets yeast infections when on antibiotics    Recommended over the counter medications/treatments:   The use of an antihistamine (Claritin or Zyrtec) and a nasal steroid spray (Flonase or Nasacort) may help with sinus congestion and drainage. Mucinex will also help thin secretions and improve congestion. Works best if drinking plenty of water. Honey with or without Lemon may also help with coughing. Probiotics daily may help boost immune system. Alternating hot liquids with cold liquids helps to sooth sore throat  Use Acetaminophen (Tylenol) to help relieve fever, chills or body aches. If allowed, you may alternate using ibuprofen (Motrin) and Tylenol. Do not exceed 3,000mg of Tylenol in a 24 hour time period. Make sure to stay well hydrated by drinking plenty of water. Follow up with primary care provider in 1 to 2 days or as needed if no improvement or worsening of your symptoms. Discussed exam, POCT findings, plan of care, and follow-up at length with patient. Reviewed all prescribed and recommended medications, administration and side effects. Encouraged patient to follow up with PCP or return to the clinic for no improvement and or worsening of symptoms. All questions were answered and they verbalized understanding and were agreeable with the plan. Follow up as needed.         Electronically signed by DAMARIS Hedrick CNP on 2/16/2023 at 9:46 AM

## 2023-03-28 ENCOUNTER — OFFICE VISIT (OUTPATIENT)
Dept: FAMILY MEDICINE CLINIC | Age: 28
End: 2023-03-28

## 2023-03-28 ENCOUNTER — HOSPITAL ENCOUNTER (OUTPATIENT)
Age: 28
Setting detail: SPECIMEN
Discharge: HOME | End: 2023-03-28
Payer: COMMERCIAL

## 2023-03-28 VITALS
SYSTOLIC BLOOD PRESSURE: 100 MMHG | DIASTOLIC BLOOD PRESSURE: 66 MMHG | HEART RATE: 56 BPM | BODY MASS INDEX: 32.1 KG/M2 | HEIGHT: 64 IN | WEIGHT: 188 LBS

## 2023-03-28 DIAGNOSIS — R53.83 FATIGUE, UNSPECIFIED TYPE: ICD-10-CM

## 2023-03-28 DIAGNOSIS — N76.0 ACUTE VAGINITIS: ICD-10-CM

## 2023-03-28 DIAGNOSIS — N90.7 VULVAR CYST: ICD-10-CM

## 2023-03-28 DIAGNOSIS — N76.0 ACUTE VAGINITIS: Primary | ICD-10-CM

## 2023-03-28 LAB
CANDIDA SPECIES, DNA PROBE: NEGATIVE
GARDNERELLA VAGINALIS, DNA PROBE: NEGATIVE
SOURCE: NORMAL
TRICHOMONAS VAGINALIS DNA: NEGATIVE

## 2023-03-28 PROCEDURE — 87510 GARDNER VAG DNA DIR PROBE: CPT

## 2023-03-28 PROCEDURE — 87480 CANDIDA DNA DIR PROBE: CPT

## 2023-03-28 PROCEDURE — 87660 TRICHOMONAS VAGIN DIR PROBE: CPT

## 2023-03-28 RX ORDER — FLUCONAZOLE 100 MG/1
100 TABLET ORAL DAILY
Qty: 7 TABLET | Refills: 0 | Status: SHIPPED | OUTPATIENT
Start: 2023-03-28 | End: 2023-04-04

## 2023-03-28 SDOH — ECONOMIC STABILITY: HOUSING INSECURITY
IN THE LAST 12 MONTHS, WAS THERE A TIME WHEN YOU DID NOT HAVE A STEADY PLACE TO SLEEP OR SLEPT IN A SHELTER (INCLUDING NOW)?: NO

## 2023-03-28 SDOH — ECONOMIC STABILITY: FOOD INSECURITY: WITHIN THE PAST 12 MONTHS, YOU WORRIED THAT YOUR FOOD WOULD RUN OUT BEFORE YOU GOT MONEY TO BUY MORE.: NEVER TRUE

## 2023-03-28 SDOH — ECONOMIC STABILITY: FOOD INSECURITY: WITHIN THE PAST 12 MONTHS, THE FOOD YOU BOUGHT JUST DIDN'T LAST AND YOU DIDN'T HAVE MONEY TO GET MORE.: NEVER TRUE

## 2023-03-28 SDOH — ECONOMIC STABILITY: INCOME INSECURITY: HOW HARD IS IT FOR YOU TO PAY FOR THE VERY BASICS LIKE FOOD, HOUSING, MEDICAL CARE, AND HEATING?: NOT HARD AT ALL

## 2023-03-30 ASSESSMENT — ENCOUNTER SYMPTOMS
RESPIRATORY NEGATIVE: 1
GASTROINTESTINAL NEGATIVE: 1

## 2023-03-30 NOTE — PROGRESS NOTES
extended release tablet Take 1 tablet by mouth daily (with breakfast) Yes Kenia MotleyLUCILA lopezN - CNM        Social History     Tobacco Use    Smoking status: Never    Smokeless tobacco: Never   Substance Use Topics    Alcohol use: Never        Vitals:    03/28/23 1541   BP: 100/66   Site: Left Upper Arm   Position: Sitting   Cuff Size: Medium Adult   Pulse: 56   Weight: 188 lb (85.3 kg)   Height: 5' 4\" (1.626 m)     Estimated body mass index is 32.27 kg/m² as calculated from the following:    Height as of this encounter: 5' 4\" (1.626 m). Weight as of this encounter: 188 lb (85.3 kg). Physical Exam  Vitals and nursing note reviewed. Constitutional:       General: She is not in acute distress. Appearance: She is well-developed. She is not diaphoretic. HENT:      Head: Normocephalic and atraumatic. Eyes:      Conjunctiva/sclera: Conjunctivae normal.   Pulmonary:      Effort: Pulmonary effort is normal.   Genitourinary:     Vagina: Vaginal discharge present. Comments:  exam does show a nodular cystic lesion to the left mid labial region consistent with an inflamed cyst.  No open areas or ulcerated areas noted. Cystic area has minimal tenderness with palpation. There is white exudate noted to the vulva and with speculum exam is noted to the vaginal region as well. Cervix without inflammation or irritation. Musculoskeletal:      Cervical back: Normal range of motion. Skin:     General: Skin is warm and dry. Coloration: Skin is not pale. Findings: No erythema or rash. Neurological:      Mental Status: She is alert and oriented to person, place, and time. Psychiatric:         Behavior: Behavior normal.         Thought Content: Thought content normal.         Judgment: Judgment normal.       ASSESSMENT/PLAN:    Encounter Diagnoses   Name Primary?     Acute vaginitis Yes    Vulvar cyst     Fatigue, unspecified type      Orders Placed This Encounter   Medications    fluconazole

## 2023-04-22 ENCOUNTER — HOSPITAL ENCOUNTER (OUTPATIENT)
Age: 28
Discharge: HOME | End: 2023-04-22
Payer: COMMERCIAL

## 2023-04-22 DIAGNOSIS — R53.83 FATIGUE, UNSPECIFIED TYPE: ICD-10-CM

## 2023-04-22 LAB
ABSOLUTE EOS #: 0.08 K/UL (ref 0–0.44)
ABSOLUTE IMMATURE GRANULOCYTE: 0.04 K/UL (ref 0–0.3)
ABSOLUTE LYMPH #: 2.75 K/UL (ref 1.1–3.7)
ABSOLUTE MONO #: 0.45 K/UL (ref 0.1–1.2)
ANION GAP SERPL CALCULATED.3IONS-SCNC: 10 MMOL/L (ref 9–17)
BASOPHILS # BLD: 1 % (ref 0–2)
BASOPHILS ABSOLUTE: 0.06 K/UL (ref 0–0.2)
BUN SERPL-MCNC: 13 MG/DL (ref 6–20)
BUN/CREAT BLD: 21 (ref 9–20)
CALCIUM SERPL-MCNC: 9.7 MG/DL (ref 8.6–10.4)
CHLORIDE SERPL-SCNC: 103 MMOL/L (ref 98–107)
CO2 SERPL-SCNC: 27 MMOL/L (ref 20–31)
CREAT SERPL-MCNC: 0.62 MG/DL (ref 0.5–0.9)
EOSINOPHILS RELATIVE PERCENT: 1 % (ref 1–4)
GFR SERPL CREATININE-BSD FRML MDRD: >60 ML/MIN/1.73M2
GLUCOSE SERPL-MCNC: 106 MG/DL (ref 70–99)
HCT VFR BLD AUTO: 43.9 % (ref 36.3–47.1)
HGB BLD-MCNC: 14.6 G/DL (ref 11.9–15.1)
IMMATURE GRANULOCYTES: 0 %
LYMPHOCYTES # BLD: 29 % (ref 24–43)
MCH RBC QN AUTO: 28.6 PG (ref 25.2–33.5)
MCHC RBC AUTO-ENTMCNC: 33.3 G/DL (ref 25.2–33.5)
MCV RBC AUTO: 85.9 FL (ref 82.6–102.9)
MONOCYTES # BLD: 5 % (ref 3–12)
NRBC AUTOMATED: 0 PER 100 WBC
PDW BLD-RTO: 12.4 % (ref 11.8–14.4)
PLATELET # BLD AUTO: 441 K/UL (ref 138–453)
PMV BLD AUTO: 9.6 FL (ref 8.1–13.5)
POTASSIUM SERPL-SCNC: 4 MMOL/L (ref 3.7–5.3)
RBC # BLD: 5.11 M/UL (ref 3.95–5.11)
SEG NEUTROPHILS: 64 % (ref 36–65)
SEGMENTED NEUTROPHILS ABSOLUTE COUNT: 6.27 K/UL (ref 1.5–8.1)
SODIUM SERPL-SCNC: 140 MMOL/L (ref 135–144)
TSH SERPL-ACNC: 0.96 UIU/ML (ref 0.3–5)
WBC # BLD AUTO: 9.7 K/UL (ref 3.5–11.3)

## 2023-04-22 PROCEDURE — 84439 ASSAY OF FREE THYROXINE: CPT

## 2023-04-22 PROCEDURE — 84443 ASSAY THYROID STIM HORMONE: CPT

## 2023-04-22 PROCEDURE — 85025 COMPLETE CBC W/AUTO DIFF WBC: CPT

## 2023-04-22 PROCEDURE — 83550 IRON BINDING TEST: CPT

## 2023-04-22 PROCEDURE — 83540 ASSAY OF IRON: CPT

## 2023-04-22 PROCEDURE — 36415 COLL VENOUS BLD VENIPUNCTURE: CPT

## 2023-04-22 PROCEDURE — 80048 BASIC METABOLIC PNL TOTAL CA: CPT

## 2023-04-22 PROCEDURE — 82728 ASSAY OF FERRITIN: CPT

## 2023-04-23 LAB
FERRITIN SERPL-MCNC: 52 NG/ML (ref 13–150)
IRON SATURATION: 33 % (ref 20–55)
IRON SERPL-MCNC: 98 UG/DL (ref 37–145)
T4 FREE SERPL-MCNC: 1.3 NG/DL (ref 0.9–1.7)
TIBC SERPL-MCNC: 299 UG/DL (ref 250–450)
UNSATURATED IRON BINDING CAPACITY: 201 UG/DL (ref 112–347)

## 2023-11-13 ENCOUNTER — OFFICE VISIT (OUTPATIENT)
Dept: PRIMARY CARE CLINIC | Age: 28
End: 2023-11-13

## 2023-11-13 VITALS
HEIGHT: 64 IN | DIASTOLIC BLOOD PRESSURE: 66 MMHG | SYSTOLIC BLOOD PRESSURE: 112 MMHG | HEART RATE: 98 BPM | WEIGHT: 193 LBS | OXYGEN SATURATION: 98 % | TEMPERATURE: 99.7 F | BODY MASS INDEX: 32.95 KG/M2 | RESPIRATION RATE: 16 BRPM

## 2023-11-13 DIAGNOSIS — R05.1 ACUTE COUGH: ICD-10-CM

## 2023-11-13 DIAGNOSIS — J01.10 ACUTE NON-RECURRENT FRONTAL SINUSITIS: Primary | ICD-10-CM

## 2023-11-13 DIAGNOSIS — T36.95XA ANTIBIOTIC-INDUCED YEAST INFECTION: ICD-10-CM

## 2023-11-13 DIAGNOSIS — B37.9 ANTIBIOTIC-INDUCED YEAST INFECTION: ICD-10-CM

## 2023-11-13 LAB
INFLUENZA A ANTIGEN, POC: NEGATIVE
INFLUENZA B ANTIGEN, POC: NEGATIVE
LOT EXPIRE DATE: NORMAL
LOT KIT NUMBER: NORMAL
SARS-COV-2, POC: NORMAL
VALID INTERNAL CONTROL: NORMAL
VENDOR AND KIT NAME POC: NORMAL

## 2023-11-13 RX ORDER — BENZONATATE 100 MG/1
100 CAPSULE ORAL 3 TIMES DAILY PRN
Qty: 30 CAPSULE | Refills: 0 | Status: SHIPPED | OUTPATIENT
Start: 2023-11-13 | End: 2023-11-23

## 2023-11-13 RX ORDER — AZITHROMYCIN 250 MG/1
TABLET, FILM COATED ORAL
Qty: 1 PACKET | Refills: 0 | Status: SHIPPED | OUTPATIENT
Start: 2023-11-13

## 2023-11-13 RX ORDER — FLUCONAZOLE 150 MG/1
TABLET ORAL
Qty: 2 TABLET | Refills: 0 | Status: SHIPPED | OUTPATIENT
Start: 2023-11-13

## 2023-11-13 ASSESSMENT — ENCOUNTER SYMPTOMS
COUGH: 1
SORE THROAT: 1
SHORTNESS OF BREATH: 0
CHEST TIGHTNESS: 0
RHINORRHEA: 1
WHEEZING: 0

## 2023-11-13 NOTE — PROGRESS NOTES
DARIUS DENNIS [place] [address] [facility], [address] [place], [place] [address]on [place]                        Telephone [phone]             Fax [phone]     Matthew Avalos  1995  TIF:[id number]   Date of visit:  11/13/2023    Subjective:    Matthew Avalos is a 34 y.o.  female who presents to [facility] today (11/13/2023) for evaluation of:    Chief Complaint   Patient presents with    URI     Pt with cough x 1 week with temp up to 102 this weekend. Cough  This is a new problem. The current episode started in the past 7 days (11/07/23). The problem has been gradually worsening. The problem occurs every few minutes. The cough is Non-productive. Associated symptoms include a fever (intermittent over the weekend, low grade today), headaches (intermittent), nasal congestion, postnasal drip, rhinorrhea and a sore throat (with coughing). Pertinent negatives include no chest pain, rash, shortness of breath or wheezing. Nothing aggravates the symptoms. Treatments tried: nyquil, delsym, mucinex, tylenol, ibuprofen. The treatment provided mild relief. She has the following problem list:  There is no problem list on file for this patient. Current medications are:  Current Outpatient Medications   Medication Sig Dispense Refill    benzonatate (TESSALON) 100 MG capsule Take 1 capsule by mouth 3 times daily as needed for Cough 30 capsule 0    azithromycin (ZITHROMAX Z-RENUKA) 250 MG tablet Take 2 tablets (500 mg) on Day 1, and then take 1 tablet (250 mg) on days 2 through 5. 1 packet 0    fluconazole (DIFLUCAN) 150 MG tablet Take one tab now then repeat in 72 hours. 2 tablet 0     No current facility-administered medications for this visit. She is allergic to pcn [penicillins]. .    She  reports that she has never smoked.  She has never used smokeless tobacco.      Objective:    Vitals:    11/13/23 0903   BP: 112/66   Site: Left

## 2023-11-16 ENCOUNTER — TELEPHONE (OUTPATIENT)
Dept: PRIMARY CARE CLINIC | Age: 28
End: 2023-11-16

## 2023-11-16 DIAGNOSIS — J01.10 ACUTE NON-RECURRENT FRONTAL SINUSITIS: Primary | ICD-10-CM

## 2023-11-16 RX ORDER — DOXYCYCLINE HYCLATE 100 MG
100 TABLET ORAL 2 TIMES DAILY
Qty: 20 TABLET | Refills: 0 | Status: SHIPPED | OUTPATIENT
Start: 2023-11-16 | End: 2023-11-26

## 2023-11-16 NOTE — TELEPHONE ENCOUNTER
Pt was seen Monday in walk in; received 3 meds, and now has a rash. I advised pt to stop taking the Z-pack and that you may have another med for her. If so, she uses [facility].

## 2023-11-30 ENCOUNTER — OFFICE VISIT (OUTPATIENT)
Dept: FAMILY MEDICINE CLINIC | Age: 28
End: 2023-11-30
Payer: COMMERCIAL

## 2023-11-30 VITALS
SYSTOLIC BLOOD PRESSURE: 110 MMHG | HEART RATE: 74 BPM | BODY MASS INDEX: 32.49 KG/M2 | OXYGEN SATURATION: 96 % | WEIGHT: 195 LBS | HEIGHT: 65 IN | DIASTOLIC BLOOD PRESSURE: 80 MMHG

## 2023-11-30 DIAGNOSIS — Z00.00 ANNUAL VISIT FOR GENERAL ADULT MEDICAL EXAMINATION WITHOUT ABNORMAL FINDINGS: Primary | ICD-10-CM

## 2023-11-30 DIAGNOSIS — R05.1 ACUTE COUGH: ICD-10-CM

## 2023-11-30 PROCEDURE — 99395 PREV VISIT EST AGE 18-39: CPT | Performed by: NURSE PRACTITIONER

## 2023-11-30 ASSESSMENT — ENCOUNTER SYMPTOMS
SINUS PRESSURE: 0
RHINORRHEA: 0
ABDOMINAL DISTENTION: 0
DIARRHEA: 0
VOMITING: 0
WHEEZING: 0
COUGH: 1
SORE THROAT: 0
CONSTIPATION: 0
CHEST TIGHTNESS: 0
SINUS PAIN: 0
NAUSEA: 0
COLOR CHANGE: 0
SHORTNESS OF BREATH: 0

## 2023-11-30 NOTE — PROGRESS NOTES
[address]  [address] [facility] [address]  Dept: [phone]  Dept Fax: [phone]  Loc: [phone]    Geoffrey Brock is a 34 y.o. female who presentsto the walk in clinic today for her medical conditions/complaints as noted below. Geoffrey Brock is c/o of \[facility]\"" Care      HPI:     Patient here to reestablish care. Was previously a patient within this office seeing Brionna Marques who has since left. Also here for an annual examination. Overall is doing well. Denies any specific issues or concerns. Did have a respiratory infection recently and still has a lingering cough and congestion. URI was about 3 weeks ago. Symptoms are improving. Past Medical History:   Diagnosis Date    Concussion     h/o, x 7, athletics    COVID-19         No current outpatient medications on file. No current facility-administered medications for this visit. Allergies   Allergen Reactions    Pcn [Penicillins] Hives       Reviewed PMH, SH, and FH with thepatient and updated. Subjective:      Review of Systems   Constitutional:  Negative for activity change, appetite change, chills, diaphoresis, fatigue and fever. HENT:  Positive for congestion. Negative for ear discharge, ear pain, postnasal drip, rhinorrhea, sinus pressure, sinus pain and sore throat. Respiratory:  Positive for cough. Negative for chest tightness, shortness of breath and wheezing. Cardiovascular:  Negative for chest pain, palpitations and leg swelling. Gastrointestinal:  Negative for abdominal distention, constipation, diarrhea, nausea and vomiting. Genitourinary:  Negative for difficulty urinating, dysuria, frequency, hematuria, pelvic pain, vaginal bleeding, vaginal discharge and vaginal pain. Skin:  Negative for color change and rash. Neurological:  Negative for dizziness, weakness, light-headedness, numbness and headaches.

## 2024-01-18 ENCOUNTER — OFFICE VISIT (OUTPATIENT)
Dept: OBGYN | Age: 29
End: 2024-01-18
Payer: COMMERCIAL

## 2024-01-18 VITALS
RESPIRATION RATE: 14 BRPM | DIASTOLIC BLOOD PRESSURE: 78 MMHG | WEIGHT: 197.4 LBS | HEART RATE: 75 BPM | OXYGEN SATURATION: 97 % | BODY MASS INDEX: 32.89 KG/M2 | SYSTOLIC BLOOD PRESSURE: 122 MMHG | HEIGHT: 65 IN

## 2024-01-18 DIAGNOSIS — E88.810 INSULIN RESISTANCE SYNDROME: ICD-10-CM

## 2024-01-18 DIAGNOSIS — N83.9 DISORDER OF OVULATION: ICD-10-CM

## 2024-01-18 DIAGNOSIS — Z01.419 WOMEN'S ANNUAL ROUTINE GYNECOLOGICAL EXAMINATION: Primary | ICD-10-CM

## 2024-01-18 PROCEDURE — 99395 PREV VISIT EST AGE 18-39: CPT | Performed by: ADVANCED PRACTICE MIDWIFE

## 2024-01-18 ASSESSMENT — PATIENT HEALTH QUESTIONNAIRE - PHQ9
SUM OF ALL RESPONSES TO PHQ QUESTIONS 1-9: 0
SUM OF ALL RESPONSES TO PHQ9 QUESTIONS 1 & 2: 0
SUM OF ALL RESPONSES TO PHQ QUESTIONS 1-9: 0
2. FEELING DOWN, DEPRESSED OR HOPELESS: 0
1. LITTLE INTEREST OR PLEASURE IN DOING THINGS: 0

## 2024-01-18 ASSESSMENT — ENCOUNTER SYMPTOMS
EYES NEGATIVE: 1
RESPIRATORY NEGATIVE: 1
GASTROINTESTINAL NEGATIVE: 1

## 2024-01-18 NOTE — PROGRESS NOTES
Subjective:      Patient ID: Kaylynn Ramires  is a 28 y.o. y.o. female.    Kaylynn presents today for an annual examination. She reports menses about every 40 days, bleeding for 6-7 days and requiring a super tampon change every 2-3 hours for the first 2 days then reducing to a regular tampon change every 3-4 hours. She reports menstrual cramping the first two days she would rate 6-7/10 and uses ibuprofen 800 mg. Q8H for the pain. She is  and sexually active, she reports no pain or bleeding with intercourse. She and her spouse are trying to conceive, are under care at [facility] and considering IVF. She has no worries or concerns.        Review of Systems   Constitutional: Negative.    HENT: Negative.     Eyes: Negative.    Respiratory: Negative.     Cardiovascular: Negative.    Gastrointestinal: Negative.    Genitourinary: Negative.    Musculoskeletal: Negative.    Skin: Negative.    Neurological: Negative.    Psychiatric/Behavioral: Negative.     Breast ROS: negative    Objective:   /78   Pulse 75   Resp 14   Ht 1.651 m (5' 5\")   Wt 89.5 kg (197 lb 6.4 oz)   LMP 12/28/2023 (Exact Date)   SpO2 97%   BMI 32.85 kg/m²   Physical Exam  Constitutional:       Appearance: She is well-developed. She is obese.   HENT:      Head: Normocephalic and atraumatic.   Eyes:      Conjunctiva/sclera: Conjunctivae normal.      Pupils: Pupils are equal, round, and reactive to light.   Cardiovascular:      Rate and Rhythm: Normal rate and regular rhythm.      Heart sounds: Normal heart sounds.   Pulmonary:      Effort: Pulmonary effort is normal.      Breath sounds: Normal breath sounds.   Chest:   Breasts:     Breasts are symmetrical.      Right: No inverted nipple, mass, nipple discharge, skin change or tenderness.      Left: No inverted nipple, mass, nipple discharge, skin change or tenderness.   Abdominal:      General: Bowel sounds are normal.      Palpations: Abdomen is soft.   Genitourinary:     General:

## 2024-05-31 ENCOUNTER — HOSPITAL ENCOUNTER (OUTPATIENT)
Age: 29
Discharge: HOME | End: 2024-05-31
Payer: COMMERCIAL

## 2024-05-31 LAB
ABO + RH BLD: NORMAL
CHLAMYDIA DNA UR QL NAA+PROBE: NORMAL
CMV IGG SERPL QL IA: <0.2
CMV IGM SERPL QL IA: 0.2
HBV CORE AB SER QL: NONREACTIVE
HBV SURFACE AG SERPL QL IA: NONREACTIVE
HCV AB SERPL QL IA: NONREACTIVE
N GONORRHOEA DNA UR QL NAA+PROBE: NORMAL
RUBV IGG SERPL QL IA: 105 IU/ML
SPECIMEN DESCRIPTION: NORMAL
T PALLIDUM AB SER QL IA: NONREACTIVE

## 2024-05-31 PROCEDURE — 87340 HEPATITIS B SURFACE AG IA: CPT

## 2024-05-31 PROCEDURE — 87389 HIV-1 AG W/HIV-1&-2 AB AG IA: CPT

## 2024-05-31 PROCEDURE — 86780 TREPONEMA PALLIDUM: CPT

## 2024-05-31 PROCEDURE — 86803 HEPATITIS C AB TEST: CPT

## 2024-05-31 PROCEDURE — 86790 VIRUS ANTIBODY NOS: CPT

## 2024-05-31 PROCEDURE — 86900 BLOOD TYPING SEROLOGIC ABO: CPT

## 2024-05-31 PROCEDURE — 86617 LYME DISEASE ANTIBODY: CPT

## 2024-05-31 PROCEDURE — 86901 BLOOD TYPING SEROLOGIC RH(D): CPT

## 2024-05-31 PROCEDURE — 87591 N.GONORRHOEAE DNA AMP PROB: CPT

## 2024-05-31 PROCEDURE — 86787 VARICELLA-ZOSTER ANTIBODY: CPT

## 2024-05-31 PROCEDURE — 87491 CHLMYD TRACH DNA AMP PROBE: CPT

## 2024-05-31 PROCEDURE — 86645 CMV ANTIBODY IGM: CPT

## 2024-05-31 PROCEDURE — 86704 HEP B CORE ANTIBODY TOTAL: CPT

## 2024-05-31 PROCEDURE — 86762 RUBELLA ANTIBODY: CPT

## 2024-05-31 PROCEDURE — 86644 CMV ANTIBODY: CPT

## 2024-05-31 PROCEDURE — 36415 COLL VENOUS BLD VENIPUNCTURE: CPT

## 2024-06-02 LAB — HTLV I/II AB: NEGATIVE

## 2024-06-03 LAB
B BURGDOR IGG SER QL IB: NEGATIVE
B BURGDOR IGM SER QL IB: NEGATIVE
CHLAMYDIA DNA UR QL NAA+PROBE: NEGATIVE
N GONORRHOEA DNA UR QL NAA+PROBE: NEGATIVE
SPECIMEN DESCRIPTION: NORMAL
VZV IGG SER QL IA: 1.36

## 2024-06-04 LAB — HIV 1+2 AB+HIV1 P24 AG SERPL QL IA: NONREACTIVE

## 2025-01-02 ENCOUNTER — OFFICE VISIT (OUTPATIENT)
Dept: PRIMARY CARE CLINIC | Age: 30
End: 2025-01-02
Payer: COMMERCIAL

## 2025-01-02 VITALS
WEIGHT: 191.6 LBS | HEART RATE: 95 BPM | DIASTOLIC BLOOD PRESSURE: 78 MMHG | BODY MASS INDEX: 31.88 KG/M2 | SYSTOLIC BLOOD PRESSURE: 114 MMHG | OXYGEN SATURATION: 99 % | TEMPERATURE: 97.6 F

## 2025-01-02 DIAGNOSIS — J01.10 ACUTE NON-RECURRENT FRONTAL SINUSITIS: Primary | ICD-10-CM

## 2025-01-02 PROCEDURE — 99213 OFFICE O/P EST LOW 20 MIN: CPT

## 2025-01-02 RX ORDER — ASPIRIN 81 MG/1
81 TABLET ORAL DAILY
COMMUNITY
Start: 2024-11-22 | End: 2025-11-22

## 2025-01-02 RX ORDER — CEFUROXIME AXETIL 500 MG/1
500 TABLET ORAL 2 TIMES DAILY
Qty: 14 TABLET | Refills: 0 | Status: SHIPPED | OUTPATIENT
Start: 2025-01-02 | End: 2025-01-09

## 2025-01-02 ASSESSMENT — ENCOUNTER SYMPTOMS
SINUS PAIN: 1
EYE DISCHARGE: 0
STRIDOR: 0
WHEEZING: 0
TROUBLE SWALLOWING: 0
VOICE CHANGE: 0
SINUS PRESSURE: 1
COUGH: 1
COLOR CHANGE: 0
RHINORRHEA: 1
VOMITING: 1
SHORTNESS OF BREATH: 0
SORE THROAT: 1
DIARRHEA: 0
ABDOMINAL PAIN: 0
NAUSEA: 0

## 2025-01-02 NOTE — PATIENT INSTRUCTIONS
Complete full course of antibiotics  Continue with mucinex and nasal sprays  May use a kateryna pot or saline rinses as tolerated  May use tylenol for headaches  Increase water intake  If symptoms worsen follow up with PCP  Patient verbalized understanding and agrees with plan of care

## 2025-01-11 ENCOUNTER — HOSPITAL ENCOUNTER (OUTPATIENT)
Age: 30
Discharge: HOME | End: 2025-01-11
Payer: COMMERCIAL

## 2025-01-11 ENCOUNTER — OFFICE VISIT (OUTPATIENT)
Dept: PRIMARY CARE CLINIC | Age: 30
End: 2025-01-11
Payer: COMMERCIAL

## 2025-01-11 VITALS
TEMPERATURE: 98.6 F | DIASTOLIC BLOOD PRESSURE: 68 MMHG | SYSTOLIC BLOOD PRESSURE: 116 MMHG | BODY MASS INDEX: 31.78 KG/M2 | WEIGHT: 191 LBS | HEART RATE: 74 BPM | OXYGEN SATURATION: 98 %

## 2025-01-11 DIAGNOSIS — R30.0 DYSURIA: ICD-10-CM

## 2025-01-11 DIAGNOSIS — N30.01 ACUTE CYSTITIS WITH HEMATURIA: ICD-10-CM

## 2025-01-11 DIAGNOSIS — N30.01 ACUTE CYSTITIS WITH HEMATURIA: Primary | ICD-10-CM

## 2025-01-11 LAB
BACTERIA URNS QL MICRO: ABNORMAL
BILIRUB UR QL STRIP: NEGATIVE
CHARACTER UR: ABNORMAL
CLARITY UR: CLEAR
COLOR UR: ABNORMAL
COMMENT: ABNORMAL
EPI CELLS #/AREA URNS HPF: ABNORMAL /HPF (ref 0–5)
GLUCOSE UR STRIP-MCNC: ABNORMAL MG/DL
HGB UR QL STRIP.AUTO: NEGATIVE
KETONES UR STRIP-MCNC: NEGATIVE MG/DL
LEUKOCYTE ESTERASE UR QL STRIP: ABNORMAL
NITRITE UR QL STRIP: POSITIVE
PH UR STRIP: 6 [PH] (ref 5–6)
PROT UR STRIP-MCNC: ABNORMAL MG/DL
RBC #/AREA URNS HPF: ABNORMAL /HPF (ref 0–4)
SP GR UR STRIP: 1.02 (ref 1.01–1.02)
UROBILINOGEN UR STRIP-ACNC: NORMAL EU/DL (ref 0–1)
WBC #/AREA URNS HPF: ABNORMAL /HPF (ref 0–4)

## 2025-01-11 PROCEDURE — 99213 OFFICE O/P EST LOW 20 MIN: CPT | Performed by: FAMILY MEDICINE

## 2025-01-11 PROCEDURE — 81001 URINALYSIS AUTO W/SCOPE: CPT

## 2025-01-11 PROCEDURE — 87086 URINE CULTURE/COLONY COUNT: CPT

## 2025-01-11 RX ORDER — CEPHALEXIN 500 MG/1
500 CAPSULE ORAL 3 TIMES DAILY
Qty: 21 CAPSULE | Refills: 0 | Status: SHIPPED | OUTPATIENT
Start: 2025-01-11 | End: 2025-01-18

## 2025-01-11 ASSESSMENT — PATIENT HEALTH QUESTIONNAIRE - PHQ9
SUM OF ALL RESPONSES TO PHQ QUESTIONS 1-9: 0
SUM OF ALL RESPONSES TO PHQ9 QUESTIONS 1 & 2: 0
SUM OF ALL RESPONSES TO PHQ QUESTIONS 1-9: 0
2. FEELING DOWN, DEPRESSED OR HOPELESS: NOT AT ALL
1. LITTLE INTEREST OR PLEASURE IN DOING THINGS: NOT AT ALL
SUM OF ALL RESPONSES TO PHQ QUESTIONS 1-9: 0
SUM OF ALL RESPONSES TO PHQ QUESTIONS 1-9: 0

## 2025-01-11 ASSESSMENT — ENCOUNTER SYMPTOMS
CONSTIPATION: 0
ABDOMINAL PAIN: 0
NAUSEA: 0
DIARRHEA: 0
VOMITING: 0

## 2025-01-11 NOTE — PROGRESS NOTES
2025     Kaylynn Ramires (:  1995) is a 29 y.o. female, here for evaluation of the following medical concerns:    Dysuria   This is a new problem. The current episode started yesterday (last night started to notice some increased lower abdominal cramping). The problem has been unchanged. Quality: stinging sensation after urination. The pain is mild. There has been no fever. Associated symptoms include frequency and hematuria (did see a little blood on the toilet paper after wiping). Pertinent negatives include no chills, flank pain, nausea, urgency or vomiting. Treatments tried: AZO. The treatment provided mild relief.     Did review patient's med list, allergies, social history,pmhx and pshx today as noted in the record.      Review of Systems   Constitutional:  Negative for chills, fatigue and fever.   Gastrointestinal:  Negative for abdominal pain, constipation, diarrhea, nausea and vomiting.   Genitourinary:  Positive for dysuria, frequency and hematuria (did see a little blood on the toilet paper after wiping). Negative for flank pain and urgency.       Prior to Visit Medications    Medication Sig Taking? Authorizing Provider   PRENATAL VIT-DOCUSATE-IRON-FA PO Take 1 tablet by mouth daily Yes Martinez uBllock MD   aspirin 81 MG EC tablet Take 1 tablet by mouth daily Yes Martinez Bullock MD        Social History     Tobacco Use    Smoking status: Never    Smokeless tobacco: Never   Substance Use Topics    Alcohol use: Never        Vitals:    25 0944   BP: 116/68   Site: Left Upper Arm   Position: Sitting   Cuff Size: Large Adult   Pulse: 74   Temp: 98.6 °F (37 °C)   TempSrc: Tympanic   SpO2: 98%   Weight: 86.6 kg (191 lb)     Estimated body mass index is 31.78 kg/m² as calculated from the following:    Height as of 24: 1.651 m (5' 5\").    Weight as of this encounter: 86.6 kg (191 lb).    Physical Exam  Vitals and nursing note reviewed.   Constitutional:       General: She is

## 2025-01-12 LAB
MICROORGANISM SPEC CULT: ABNORMAL
SERVICE CMNT-IMP: ABNORMAL
SPECIMEN DESCRIPTION: ABNORMAL

## 2025-01-13 LAB
MICROORGANISM SPEC CULT: ABNORMAL
SERVICE CMNT-IMP: ABNORMAL
SPECIMEN DESCRIPTION: ABNORMAL